# Patient Record
Sex: MALE | Race: WHITE | NOT HISPANIC OR LATINO | Employment: OTHER | URBAN - NONMETROPOLITAN AREA
[De-identification: names, ages, dates, MRNs, and addresses within clinical notes are randomized per-mention and may not be internally consistent; named-entity substitution may affect disease eponyms.]

---

## 2023-04-11 ENCOUNTER — OFFICE VISIT (OUTPATIENT)
Dept: FAMILY MEDICINE CLINIC | Facility: CLINIC | Age: 73
End: 2023-04-11
Payer: MEDICARE

## 2023-04-11 VITALS
HEIGHT: 66 IN | SYSTOLIC BLOOD PRESSURE: 132 MMHG | DIASTOLIC BLOOD PRESSURE: 86 MMHG | HEART RATE: 76 BPM | RESPIRATION RATE: 15 BRPM | WEIGHT: 180 LBS | TEMPERATURE: 97.1 F | BODY MASS INDEX: 28.93 KG/M2 | OXYGEN SATURATION: 96 %

## 2023-04-11 DIAGNOSIS — J30.9 ALLERGIC RHINITIS, UNSPECIFIED SEASONALITY, UNSPECIFIED TRIGGER: ICD-10-CM

## 2023-04-11 DIAGNOSIS — F51.01 PRIMARY INSOMNIA: ICD-10-CM

## 2023-04-11 DIAGNOSIS — K44.9 HIATAL HERNIA: ICD-10-CM

## 2023-04-11 DIAGNOSIS — I10 PRIMARY HYPERTENSION: ICD-10-CM

## 2023-04-11 DIAGNOSIS — E11.9 TYPE 2 DIABETES MELLITUS WITHOUT COMPLICATION, WITHOUT LONG-TERM CURRENT USE OF INSULIN: Primary | ICD-10-CM

## 2023-04-11 PROBLEM — H25.13 AGE-RELATED NUCLEAR CATARACT OF BOTH EYES: Status: ACTIVE | Noted: 2019-09-24

## 2023-04-11 LAB
EXPIRATION DATE: NORMAL
GLUCOSE BLDC GLUCOMTR-MCNC: 192 MG/DL (ref 70–130)
HBA1C MFR BLD: 7.5 %
Lab: NORMAL
POC MICROALBUMIN URINE: NORMAL

## 2023-04-11 RX ORDER — ASPIRIN 81 MG/1
81 TABLET ORAL DAILY
COMMUNITY

## 2023-04-11 RX ORDER — METFORMIN HYDROCHLORIDE 500 MG/1
500 TABLET, EXTENDED RELEASE ORAL 2 TIMES DAILY
Qty: 180 TABLET | Refills: 0 | Status: SHIPPED | OUTPATIENT
Start: 2023-04-11

## 2023-04-11 RX ORDER — METOPROLOL SUCCINATE 50 MG/1
25 TABLET, EXTENDED RELEASE ORAL DAILY
Qty: 90 TABLET | Refills: 1 | Status: SHIPPED | OUTPATIENT
Start: 2023-04-11

## 2023-04-11 RX ORDER — TRAZODONE HYDROCHLORIDE 50 MG/1
25 TABLET ORAL NIGHTLY
COMMUNITY

## 2023-04-11 RX ORDER — METFORMIN HYDROCHLORIDE 500 MG/1
500 TABLET, EXTENDED RELEASE ORAL
COMMUNITY
End: 2023-04-11 | Stop reason: SDUPTHER

## 2023-04-11 RX ORDER — MULTIPLE VITAMINS W/ MINERALS TAB 9MG-400MCG
1 TAB ORAL DAILY
COMMUNITY

## 2023-04-11 RX ORDER — FINASTERIDE 5 MG/1
5 TABLET, FILM COATED ORAL DAILY
COMMUNITY

## 2023-04-11 RX ORDER — TAMSULOSIN HYDROCHLORIDE 0.4 MG/1
1 CAPSULE ORAL DAILY
COMMUNITY

## 2023-04-11 RX ORDER — METOPROLOL SUCCINATE 50 MG/1
25 TABLET, EXTENDED RELEASE ORAL DAILY
COMMUNITY
End: 2023-04-11 | Stop reason: SDUPTHER

## 2023-04-11 RX ORDER — CHLORAL HYDRATE 500 MG
CAPSULE ORAL
COMMUNITY

## 2023-04-11 RX ORDER — CETIRIZINE HYDROCHLORIDE 10 MG/1
10 TABLET ORAL NIGHTLY PRN
Qty: 90 TABLET | Refills: 1 | Status: SHIPPED | OUTPATIENT
Start: 2023-04-11

## 2023-04-11 RX ORDER — SODIUM PHOSPHATE,MONO-DIBASIC 19G-7G/118
ENEMA (ML) RECTAL
COMMUNITY

## 2023-04-11 RX ORDER — ALPRAZOLAM 0.5 MG/1
0.5 TABLET ORAL 2 TIMES DAILY PRN
COMMUNITY
End: 2023-04-11

## 2023-04-11 RX ORDER — LORATADINE 10 MG/1
10 TABLET ORAL DAILY
COMMUNITY
End: 2023-04-11

## 2023-04-11 NOTE — PROGRESS NOTES
Follow Up Office Visit      Patient Name: Greyson Eagle  : 1950   MRN: [id number]     Chief Complaint:    Chief Complaint   Patient presents with   • Establish Care       History of Present Illness: Greyson Eagle is a 72 y.o. male who is here today to   establish care as he has been seen by another practitioner for the past couple years.  Formally was seen at the [place] office and now transferring care here.  He relates that he has diabetes needs blood work and says his sugars have not been as well controlled lately.  We would like refills on his Toprol as well as his metformin.  He also says he been struggling with insomnia and his former providers tried a number of medicines most recently trazodone but does not seem to help he says when he used to take Xanax at night this really helped him sleep well.  He also would like something for his allergies been on Claritin and a lot of sneezing congestion drainage.    His wife seems to think that because he has to do much so much caring for that he has a lot of anxiety and that is why he cannot sleep at night.    Review of Systems   Constitutional: Negative for fatigue and fever.   Respiratory: Negative for cough and shortness of breath.    Cardiovascular: Negative for chest pain and palpitations.   Skin: Negative for rash or itching      Subjective      Review of Systems:   Review of Systems    Past Medical History:   Past Medical History:   Diagnosis Date   • Allergic rhinitis    • Anxiety    • Benign essential hypertension    • Carotid artery disease     Modwerate carotid disease noted at vascular screening   • Diabetes    • Mixed hyperlipidemia        Past Surgical History:   Past Surgical History:   Procedure Laterality Date   • COLON RESECTION      Colonic resection due to large polyp   • CYSTECTOMY      Resection of cyst at previous site of colonic surg   • HEMORRHOIDECTOMY         Family History: History reviewed. No pertinent family  history.    Social History:   Social History     Socioeconomic History   • Marital status:    Tobacco Use   • Smoking status: Never   • Smokeless tobacco: Never   Vaping Use   • Vaping Use: Never used   Substance and Sexual Activity   • Alcohol use: Not Currently   • Drug use: Never   • Sexual activity: Defer       Medications:     Current Outpatient Medications:   •  aspirin 81 MG EC tablet, Take 1 tablet by mouth Daily., Disp: , Rfl:   •  finasteride (PROSCAR) 5 MG tablet, Take 1 tablet by mouth Daily., Disp: , Rfl:   •  glucosamine-chondroitin 500-400 MG capsule capsule, Take  by mouth 3 (Three) Times a Day With Meals., Disp: , Rfl:   •  metFORMIN ER (GLUCOPHAGE-XR) 500 MG 24 hr tablet, Take 1 tablet by mouth 2 (Two) Times a Day., Disp: 180 tablet, Rfl: 0  •  metoprolol succinate XL (TOPROL-XL) 50 MG 24 hr tablet, Take 0.5 tablets by mouth Daily., Disp: 90 tablet, Rfl: 1  •  multivitamin with minerals (ONE-A-DAY 50 PLUS PO), Take 1 tablet by mouth Daily., Disp: , Rfl:   •  Omega-3 Fatty Acids (fish oil) 1000 MG capsule capsule, Take  by mouth Daily With Breakfast., Disp: , Rfl:   •  tamsulosin (FLOMAX) 0.4 MG capsule 24 hr capsule, Take 1 capsule by mouth Daily., Disp: , Rfl:   •  traZODone (DESYREL) 50 MG tablet, Take 25 mg by mouth Every Night., Disp: , Rfl:   •  cetirizine (zyrTEC) 10 MG tablet, Take 1 tablet by mouth At Night As Needed for Allergies., Disp: 90 tablet, Rfl: 1  •  glucose blood test strip, Use as instructed, Disp: 50 each, Rfl: 11    Allergies:   Allergies   Allergen Reactions   • Azithromycin Unknown - High Severity   • Crestor [Rosuvastatin] Unknown - High Severity   • Methylprednisolone Unknown - High Severity   • Percocet [Oxycodone-Acetaminophen] Unknown - High Severity   • Protonix [Pantoprazole] Unknown - High Severity   • Tricor [Fenofibrate] Unknown - High Severity       Objective     Physical Exam:  Vital Signs:   Vitals:    04/11/23 1056   BP: 132/86   BP Location: Left arm  "  Patient Position: Sitting   Cuff Size: Adult   Pulse: 76   Resp: 15   Temp: 97.1 °F (36.2 °C)   TempSrc: Infrared   SpO2: 96%   Weight: 81.6 kg (180 lb)   Height: 167.6 cm (66\")   PainSc: 0-No pain     Body mass index is 29.05 kg/m².     Physical Exam  Vitals and nursing note reviewed.   Constitutional:       Appearance: Normal appearance.   HENT:      Head: Normocephalic and atraumatic.      Right Ear: Tympanic membrane and ear canal normal.      Left Ear: Tympanic membrane and ear canal normal.      Nose: Nose normal.      Mouth/Throat:      Mouth: Mucous membranes are moist.      Pharynx: Oropharynx is clear. No posterior oropharyngeal erythema.   Cardiovascular:      Rate and Rhythm: Normal rate and regular rhythm.   Pulmonary:      Effort: Pulmonary effort is normal.      Breath sounds: Normal breath sounds.   Musculoskeletal:         General: Normal range of motion.      Cervical back: Normal range of motion and neck supple. No rigidity or tenderness.      Right lower leg: No edema.      Left lower leg: No edema.   Lymphadenopathy:      Cervical: No cervical adenopathy.   Skin:     General: Skin is warm and dry.   Neurological:      General: No focal deficit present.      Mental Status: He is alert.   Psychiatric:         Mood and Affect: Mood normal.         Behavior: Behavior normal.         Procedures    PHQ-9 Total Score:       Assessment / Plan      Assessment/Plan:   Diagnoses and all orders for this visit:    1. Type 2 diabetes mellitus without complication, without long-term current use of insulin (Primary)  -     CBC Auto Differential; Future  -     Comprehensive Metabolic Panel; Future  -     POC Glycosylated Hemoglobin (Hb A1C)  -     POC Glucose  -     metFORMIN ER (GLUCOPHAGE-XR) 500 MG 24 hr tablet; Take 1 tablet by mouth 2 (Two) Times a Day.  Dispense: 180 tablet; Refill: 0  -     POC Microalbumin  -     glucose blood test strip; Use as instructed  Dispense: 50 each; Refill: 11  -     CBC Auto " Differential  -     Comprehensive Metabolic Panel    2. Primary hypertension  -     metoprolol succinate XL (TOPROL-XL) 50 MG 24 hr tablet; Take 0.5 tablets by mouth Daily.  Dispense: 90 tablet; Refill: 1    3. Allergic rhinitis, unspecified seasonality, unspecified trigger  -     cetirizine (zyrTEC) 10 MG tablet; Take 1 tablet by mouth At Night As Needed for Allergies.  Dispense: 90 tablet; Refill: 1    4. Primary insomnia    5. Hiatal hernia         His A1c today is 7.5 glucose 192.  We will have him continue his current metformin dose until we get his blood work back to make sure his kidneys functions good.  Follow-up in 6 weeks he knows to work on his diet cut back on carbs and walk 30 minutes 5 days a week    Refilled his metoprolol    For allergies we will use Zyrtec at bedtime hopefully this will help with his insomnia as well as allergies.    Follow-up with urology for his prostate medicines and checkups as directed    We will likely need to do annual wellness later as well as go over blood work.    Microalbumin today as well  BMI is >= 25 and <30. (Overweight) The following options were offered after discussion;: exercise counseling/recommendations and nutrition counseling/recommendations      Follow Up:   Return in about 6 weeks (around 5/23/2023) for Recheck.        Marco Ontiveros MD  [facility]   Portions of note created with Dragon voice recognition technology

## 2023-04-12 ENCOUNTER — TELEPHONE (OUTPATIENT)
Dept: FAMILY MEDICINE CLINIC | Facility: CLINIC | Age: 73
End: 2023-04-12

## 2023-04-12 ENCOUNTER — TELEPHONE (OUTPATIENT)
Dept: FAMILY MEDICINE CLINIC | Facility: CLINIC | Age: 73
End: 2023-04-12
Payer: MEDICARE

## 2023-04-12 LAB
ALBUMIN SERPL-MCNC: 4.3 G/DL (ref 3.7–4.7)
ALBUMIN/GLOB SERPL: 2 {RATIO} (ref 1.2–2.2)
ALP SERPL-CCNC: 62 IU/L (ref 44–121)
ALT SERPL-CCNC: 14 IU/L (ref 0–44)
AST SERPL-CCNC: 14 IU/L (ref 0–40)
BASOPHILS # BLD AUTO: 0.1 X10E3/UL (ref 0–0.2)
BASOPHILS NFR BLD AUTO: 1 %
BILIRUB SERPL-MCNC: 0.3 MG/DL (ref 0–1.2)
BUN SERPL-MCNC: 21 MG/DL (ref 8–27)
BUN/CREAT SERPL: 15 (ref 10–24)
CALCIUM SERPL-MCNC: 9.5 MG/DL (ref 8.6–10.2)
CHLORIDE SERPL-SCNC: 104 MMOL/L (ref 96–106)
CO2 SERPL-SCNC: 21 MMOL/L (ref 20–29)
CREAT SERPL-MCNC: 1.37 MG/DL (ref 0.76–1.27)
EGFRCR SERPLBLD CKD-EPI 2021: 54 ML/MIN/1.73
EOSINOPHIL # BLD AUTO: 0.3 X10E3/UL (ref 0–0.4)
EOSINOPHIL NFR BLD AUTO: 4 %
ERYTHROCYTE [DISTWIDTH] IN BLOOD BY AUTOMATED COUNT: 13.1 % (ref 11.6–15.4)
GLOBULIN SER CALC-MCNC: 2.1 G/DL (ref 1.5–4.5)
GLUCOSE SERPL-MCNC: 180 MG/DL (ref 70–99)
HCT VFR BLD AUTO: 40.1 % (ref 37.5–51)
HGB BLD-MCNC: 13.5 G/DL (ref 13–17.7)
IMM GRANULOCYTES # BLD AUTO: 0 X10E3/UL (ref 0–0.1)
IMM GRANULOCYTES NFR BLD AUTO: 0 %
LYMPHOCYTES # BLD AUTO: 1.9 X10E3/UL (ref 0.7–3.1)
LYMPHOCYTES NFR BLD AUTO: 28 %
MCH RBC QN AUTO: 29.5 PG (ref 26.6–33)
MCHC RBC AUTO-ENTMCNC: 33.7 G/DL (ref 31.5–35.7)
MCV RBC AUTO: 88 FL (ref 79–97)
MONOCYTES # BLD AUTO: 0.5 X10E3/UL (ref 0.1–0.9)
MONOCYTES NFR BLD AUTO: 7 %
NEUTROPHILS # BLD AUTO: 3.9 X10E3/UL (ref 1.4–7)
NEUTROPHILS NFR BLD AUTO: 60 %
PLATELET # BLD AUTO: 218 X10E3/UL (ref 150–450)
POTASSIUM SERPL-SCNC: 4.5 MMOL/L (ref 3.5–5.2)
PROT SERPL-MCNC: 6.4 G/DL (ref 6–8.5)
RBC # BLD AUTO: 4.58 X10E6/UL (ref 4.14–5.8)
SODIUM SERPL-SCNC: 139 MMOL/L (ref 134–144)
WBC # BLD AUTO: 6.6 X10E3/UL (ref 3.4–10.8)

## 2023-04-12 NOTE — TELEPHONE ENCOUNTER
Caller: ELIER MOTA     Relationship:     Best call back number: [phone]    What test was performed: LABS    When was the test performed:  4/12/23      Additional notes:     PATIENT DOES NOT USE COMPUTER AND WANTS US TO MAIL HIS TEST RESULTS

## 2023-04-12 NOTE — TELEPHONE ENCOUNTER
Caller: CHANDRIKA MOTA    Relationship: Emergency Contact    Best call back number:     What medication are you requesting: XANAX LOW DOSE    What are your current symptoms:     UNABLE TO SLEEP     If a prescription is needed, what is your preferred pharmacy and phone number:      [facility] - [place] [address] [phone] [facility] [phone] FX     WANTED TO LET EVERYONE KNOW THAT HER  AND HERSELF LOVE THE OFFICE.  EVERYONE WAS SO NICE

## 2023-05-17 ENCOUNTER — OFFICE VISIT (OUTPATIENT)
Dept: FAMILY MEDICINE CLINIC | Facility: CLINIC | Age: 73
End: 2023-05-17
Payer: MEDICARE

## 2023-05-17 VITALS
RESPIRATION RATE: 15 BRPM | WEIGHT: 180 LBS | BODY MASS INDEX: 28.93 KG/M2 | DIASTOLIC BLOOD PRESSURE: 80 MMHG | HEIGHT: 66 IN | TEMPERATURE: 97.1 F | SYSTOLIC BLOOD PRESSURE: 132 MMHG | OXYGEN SATURATION: 97 % | HEART RATE: 62 BPM

## 2023-05-17 DIAGNOSIS — E11.65 TYPE 2 DIABETES MELLITUS WITH HYPERGLYCEMIA, WITHOUT LONG-TERM CURRENT USE OF INSULIN: Primary | ICD-10-CM

## 2023-05-17 DIAGNOSIS — F51.01 PRIMARY INSOMNIA: ICD-10-CM

## 2023-05-17 DIAGNOSIS — R79.89 ELEVATED SERUM CREATININE: ICD-10-CM

## 2023-05-17 PROCEDURE — 1159F MED LIST DOCD IN RCRD: CPT | Performed by: FAMILY MEDICINE

## 2023-05-17 PROCEDURE — 1160F RVW MEDS BY RX/DR IN RCRD: CPT | Performed by: FAMILY MEDICINE

## 2023-05-17 PROCEDURE — 99214 OFFICE O/P EST MOD 30 MIN: CPT | Performed by: FAMILY MEDICINE

## 2023-05-17 RX ORDER — MIRTAZAPINE 30 MG/1
30 TABLET, FILM COATED ORAL NIGHTLY
Qty: 30 TABLET | Refills: 5 | Status: SHIPPED | OUTPATIENT
Start: 2023-05-17

## 2023-05-17 RX ORDER — METFORMIN HYDROCHLORIDE 750 MG/1
750 TABLET, EXTENDED RELEASE ORAL
Qty: 180 TABLET | Refills: 0 | Status: SHIPPED | OUTPATIENT
Start: 2023-05-17 | End: 2023-05-18 | Stop reason: SDUPTHER

## 2023-05-17 NOTE — PROGRESS NOTES
Follow Up Office Visit      Patient Name: Greyson Eagle  : 1950   MRN: [id number]     Chief Complaint:    Chief Complaint   Patient presents with   • Follow-up       History of Present Illness: Greyson Eagle is a 72 y.o. male who is here today to   follow-up on blood work and his A1c was always elevated 7.5.  He relates trazodone not helping with his sleep he really wants Xanax.  His former provider tried some other medicines that did not help.  He does have a little distressed caring for his wife.  She was just recently in the hospital for 3 days.  He says he is active and mows yards and get some exercise that way.  He has been on metformin 500 twice daily without difficulty.      Review of Systems   Constitutional: Negative for fatigue and fever.   Respiratory: Negative for cough and shortness of breath.    Cardiovascular: Negative for chest pain and palpitations.   Skin: Negative for rash or itching      Subjective      Review of Systems:   Review of Systems    Past Medical History:   Past Medical History:   Diagnosis Date   • Allergic rhinitis    • Anxiety    • Benign essential hypertension    • Carotid artery disease     Modwerate carotid disease noted at vascular screening   • Diabetes    • Mixed hyperlipidemia        Past Surgical History:   Past Surgical History:   Procedure Laterality Date   • COLON RESECTION      Colonic resection due to large polyp   • CYSTECTOMY      Resection of cyst at previous site of colonic surg   • HEMORRHOIDECTOMY         Family History: History reviewed. No pertinent family history.    Social History:   Social History     Socioeconomic History   • Marital status:    Tobacco Use   • Smoking status: Never   • Smokeless tobacco: Never   Vaping Use   • Vaping Use: Never used   Substance and Sexual Activity   • Alcohol use: Not Currently   • Drug use: Never   • Sexual activity: Defer       Medications:     Current Outpatient Medications:   •  aspirin 81 MG EC  "tablet, Take 1 tablet by mouth Daily., Disp: , Rfl:   •  cetirizine (zyrTEC) 10 MG tablet, Take 1 tablet by mouth At Night As Needed for Allergies., Disp: 90 tablet, Rfl: 1  •  finasteride (PROSCAR) 5 MG tablet, Take 1 tablet by mouth Daily., Disp: , Rfl:   •  glucosamine-chondroitin 500-400 MG capsule capsule, Take  by mouth 3 (Three) Times a Day With Meals., Disp: , Rfl:   •  glucose blood test strip, Use as instructed, Disp: 50 each, Rfl: 11  •  metFORMIN ER (GLUCOPHAGE-XR) 750 MG 24 hr tablet, Take 1 tablet by mouth Daily With Breakfast., Disp: 180 tablet, Rfl: 0  •  metoprolol succinate XL (TOPROL-XL) 50 MG 24 hr tablet, Take 0.5 tablets by mouth Daily., Disp: 90 tablet, Rfl: 1  •  mirtazapine (Remeron) 30 MG tablet, Take 1 tablet by mouth Every Night., Disp: 30 tablet, Rfl: 5  •  multivitamin with minerals (ONE-A-DAY 50 PLUS PO), Take 1 tablet by mouth Daily., Disp: , Rfl:   •  Omega-3 Fatty Acids (fish oil) 1000 MG capsule capsule, Take  by mouth Daily With Breakfast., Disp: , Rfl:   •  tamsulosin (FLOMAX) 0.4 MG capsule 24 hr capsule, Take 1 capsule by mouth Daily., Disp: , Rfl:     Allergies:   Allergies   Allergen Reactions   • Azithromycin Unknown - High Severity   • Crestor [Rosuvastatin] Unknown - High Severity   • Methylprednisolone Unknown - High Severity   • Percocet [Oxycodone-Acetaminophen] Unknown - High Severity   • Protonix [Pantoprazole] Unknown - High Severity   • Tricor [Fenofibrate] Unknown - High Severity       Objective     Physical Exam:  Vital Signs:   Vitals:    05/17/23 1254   BP: 132/80   BP Location: Left arm   Patient Position: Sitting   Cuff Size: Adult   Pulse: 62   Resp: 15   Temp: 97.1 °F (36.2 °C)   TempSrc: Infrared   SpO2: 97%   Weight: 81.6 kg (180 lb)   Height: 167.6 cm (66\")   PainSc: 0-No pain     Body mass index is 29.05 kg/m².     Physical Exam  Constitutional:       Appearance: Normal appearance.   Pulmonary:      Effort: Pulmonary effort is normal.   Neurological:     "  Mental Status: He is alert.   Psychiatric:         Mood and Affect: Mood normal.         Behavior: Behavior normal.         Procedures    PHQ-9 Total Score:       Assessment / Plan      Assessment/Plan:   Diagnoses and all orders for this visit:    1. Type 2 diabetes mellitus with hyperglycemia, without long-term current use of insulin (Primary)  -     metFORMIN ER (GLUCOPHAGE-XR) 750 MG 24 hr tablet; Take 1 tablet by mouth Daily With Breakfast.  Dispense: 180 tablet; Refill: 0  -     Comprehensive Metabolic Panel; Future  -     Hemoglobin A1c; Future    2. Primary insomnia  -     mirtazapine (Remeron) 30 MG tablet; Take 1 tablet by mouth Every Night.  Dispense: 30 tablet; Refill: 5    3. Elevated serum creatinine    Labs reviewed with him    For his diabetes which is not quite to goal will work on cutting back on carbs and sweets increase metformin to 750 twice daily in the short-term he can take 500 in the morning and 2 500s in the evening until the those run out.    Avoid NSAIDs follow-up with blood work in 2 to 3 months as directed    For insomnia we will try to avoid the benzos specially Xanax and will start Remeron 30 to 60 minutes prior to bedtime as directed follow-up in 2 to 3 months as directed.  Stop trazodone      BMI is >= 25 and <30. (Overweight) The following options were offered after discussion;: exercise counseling/recommendations and nutrition counseling/recommendations      Follow Up:   Return in about 11 weeks (around 8/2/2023) for Labs prior next visit, Recheck, Schedule AWV w/next office visit.        Marco Ontiveros MD  [facility]   Portions of note created with Dragon voice recognition technology

## 2023-05-18 ENCOUNTER — TELEPHONE (OUTPATIENT)
Dept: FAMILY MEDICINE CLINIC | Facility: CLINIC | Age: 73
End: 2023-05-18
Payer: MEDICARE

## 2023-05-18 DIAGNOSIS — E11.65 TYPE 2 DIABETES MELLITUS WITH HYPERGLYCEMIA, WITHOUT LONG-TERM CURRENT USE OF INSULIN: ICD-10-CM

## 2023-05-18 RX ORDER — METFORMIN HYDROCHLORIDE 750 MG/1
750 TABLET, EXTENDED RELEASE ORAL 2 TIMES DAILY WITH MEALS
Qty: 180 TABLET | Refills: 0 | Status: SHIPPED | OUTPATIENT
Start: 2023-05-18

## 2023-05-18 NOTE — TELEPHONE ENCOUNTER
Pharmacy wanted clarification on pt's metformin,k whether it's take twice a day or once a day?    [place]: [phone]

## 2023-08-16 ENCOUNTER — LAB (OUTPATIENT)
Dept: FAMILY MEDICINE CLINIC | Facility: CLINIC | Age: 73
End: 2023-08-16
Payer: MEDICARE

## 2023-08-16 DIAGNOSIS — E11.65 TYPE 2 DIABETES MELLITUS WITH HYPERGLYCEMIA, WITHOUT LONG-TERM CURRENT USE OF INSULIN: ICD-10-CM

## 2023-08-16 PROCEDURE — 36415 COLL VENOUS BLD VENIPUNCTURE: CPT | Performed by: FAMILY MEDICINE

## 2023-08-17 LAB
ALBUMIN SERPL-MCNC: 4 G/DL (ref 3.8–4.8)
ALBUMIN/GLOB SERPL: 1.8 {RATIO} (ref 1.2–2.2)
ALP SERPL-CCNC: 59 IU/L (ref 44–121)
ALT SERPL-CCNC: 14 IU/L (ref 0–44)
AST SERPL-CCNC: 18 IU/L (ref 0–40)
BILIRUB SERPL-MCNC: 0.4 MG/DL (ref 0–1.2)
BUN SERPL-MCNC: 22 MG/DL (ref 8–27)
BUN/CREAT SERPL: 16 (ref 10–24)
CALCIUM SERPL-MCNC: 9 MG/DL (ref 8.6–10.2)
CHLORIDE SERPL-SCNC: 106 MMOL/L (ref 96–106)
CO2 SERPL-SCNC: 22 MMOL/L (ref 20–29)
CREAT SERPL-MCNC: 1.37 MG/DL (ref 0.76–1.27)
EGFRCR SERPLBLD CKD-EPI 2021: 54 ML/MIN/1.73
GLOBULIN SER CALC-MCNC: 2.2 G/DL (ref 1.5–4.5)
GLUCOSE SERPL-MCNC: 136 MG/DL (ref 70–99)
HBA1C MFR BLD: 7.7 % (ref 4.8–5.6)
POTASSIUM SERPL-SCNC: 4.7 MMOL/L (ref 3.5–5.2)
PROT SERPL-MCNC: 6.2 G/DL (ref 6–8.5)
SODIUM SERPL-SCNC: 140 MMOL/L (ref 134–144)

## 2023-09-14 ENCOUNTER — TELEPHONE (OUTPATIENT)
Dept: FAMILY MEDICINE CLINIC | Facility: CLINIC | Age: 73
End: 2023-09-14

## 2023-09-14 DIAGNOSIS — M25.569 ACUTE KNEE PAIN, UNSPECIFIED LATERALITY: Primary | ICD-10-CM

## 2023-09-14 NOTE — TELEPHONE ENCOUNTER
PATIENTS WIFE CALLED REQUESTING A REFERRAL TO ORTHOPEDICS FOR PATIENT, PREFERABLY ONE IN [place].      PATIENT FELL ON 23 WHILE PUTTING A  CALF IN THE BARN, GROUND WAS UNEVEN AND ROUGH CAUSING PATIENT TO FALL, INJURING HIS KNEE.   PATIENT WENT TO THE EMERGENCY ROOM AT [facility]. ER PHYSICIAN ADVISED PATIENT TO FOLLOW UP WITH PCP AND GET A REFERRAL TO ORTHOPEDICS. ER DID STATE THAT NOTHING WAS BROKEN.     PATIENT HAS AN APPT WITH DR VEGA ON , PATIENT WILL BRING ER SUMMARY TO APPT.

## 2023-09-14 NOTE — TELEPHONE ENCOUNTER
Caller: CHANDRIKA MOTA    Relationship to patient: Emergency Contact    Best call back number: [phone]     New or established patient?  [] New  [x] Established    Date of discharge: 9/13    Facility discharged from: [facility] [place]     Diagnosis/Symptoms: KNEE PAIN, SWOLLEN KNEE

## 2023-09-14 NOTE — TELEPHONE ENCOUNTER
Caller: CHANDRIKA MOTA    Relationship: Emergency Contact    Best call back number: [phone]     What is the medical concern/diagnosis: SWOLLEN LEFT KNEE AND KNEE PAIN    What specialty or service is being requested: ORTHOPEDICS    What is the provider, practice or medical service name: REFERRAL     Any additional details: PATIENT FELL AND WENT TO THE ER ON 9/13 AND AND HAS BEEN HAVING KNEE PAIN, AND KNEE HAS BEEN SWOLLEN, ER ADVISED PATIENT TO GET WITH PRIMARY CARE PROVIDER TO GET AN ORTHOPEDIC SPECIALIST

## 2023-09-18 ENCOUNTER — OFFICE VISIT (OUTPATIENT)
Dept: FAMILY MEDICINE CLINIC | Facility: CLINIC | Age: 73
End: 2023-09-18
Payer: MEDICARE

## 2023-09-18 VITALS
HEART RATE: 69 BPM | BODY MASS INDEX: 28.45 KG/M2 | DIASTOLIC BLOOD PRESSURE: 82 MMHG | OXYGEN SATURATION: 97 % | WEIGHT: 177 LBS | SYSTOLIC BLOOD PRESSURE: 136 MMHG | HEIGHT: 66 IN

## 2023-09-18 DIAGNOSIS — Z79.899 HIGH RISK MEDICATION USE: ICD-10-CM

## 2023-09-18 DIAGNOSIS — I10 PRIMARY HYPERTENSION: ICD-10-CM

## 2023-09-18 DIAGNOSIS — E11.65 TYPE 2 DIABETES MELLITUS WITH HYPERGLYCEMIA, WITHOUT LONG-TERM CURRENT USE OF INSULIN: ICD-10-CM

## 2023-09-18 DIAGNOSIS — E78.5 HYPERLIPIDEMIA, UNSPECIFIED HYPERLIPIDEMIA TYPE: ICD-10-CM

## 2023-09-18 DIAGNOSIS — F41.9 ANXIETY: ICD-10-CM

## 2023-09-18 DIAGNOSIS — M62.838 MUSCLE SPASM OF LEFT LOWER EXTREMITY: ICD-10-CM

## 2023-09-18 DIAGNOSIS — M25.562 ACUTE PAIN OF LEFT KNEE: ICD-10-CM

## 2023-09-18 DIAGNOSIS — Z00.00 ENCOUNTER FOR SUBSEQUENT ANNUAL WELLNESS VISIT (AWV) IN MEDICARE PATIENT: Primary | ICD-10-CM

## 2023-09-18 LAB
POC AMPHETAMINES: NEGATIVE
POC BARBITURATES: NEGATIVE
POC BENZODIAZEPHINES: NEGATIVE
POC COCAINE: NEGATIVE
POC METHADONE: NEGATIVE
POC METHAMPHETAMINE SCREEN URINE: NEGATIVE
POC OPIATES: NEGATIVE
POC OXYCODONE: NEGATIVE
POC PHENCYCLIDINE: NEGATIVE
POC PROPOXYPHENE: NEGATIVE
POC THC: NEGATIVE
POC TRICYCLIC ANTIDEPRESSANTS: NEGATIVE

## 2023-09-18 PROCEDURE — 99214 OFFICE O/P EST MOD 30 MIN: CPT | Performed by: FAMILY MEDICINE

## 2023-09-18 PROCEDURE — G0439 PPPS, SUBSEQ VISIT: HCPCS | Performed by: FAMILY MEDICINE

## 2023-09-18 PROCEDURE — 80305 DRUG TEST PRSMV DIR OPT OBS: CPT | Performed by: FAMILY MEDICINE

## 2023-09-18 PROCEDURE — 1160F RVW MEDS BY RX/DR IN RCRD: CPT | Performed by: FAMILY MEDICINE

## 2023-09-18 PROCEDURE — 3051F HG A1C>EQUAL 7.0%<8.0%: CPT | Performed by: FAMILY MEDICINE

## 2023-09-18 PROCEDURE — 1159F MED LIST DOCD IN RCRD: CPT | Performed by: FAMILY MEDICINE

## 2023-09-18 PROCEDURE — 1170F FXNL STATUS ASSESSED: CPT | Performed by: FAMILY MEDICINE

## 2023-09-18 RX ORDER — ALPRAZOLAM 0.5 MG/1
0.5 TABLET ORAL 2 TIMES DAILY PRN
Qty: 30 TABLET | Refills: 2 | Status: SHIPPED | OUTPATIENT
Start: 2023-09-18

## 2023-09-18 RX ORDER — METOPROLOL SUCCINATE 50 MG/1
25 TABLET, EXTENDED RELEASE ORAL DAILY
Qty: 90 TABLET | Refills: 1 | Status: SHIPPED | OUTPATIENT
Start: 2023-09-18

## 2023-09-18 RX ORDER — METHOCARBAMOL 500 MG/1
500 TABLET, FILM COATED ORAL 4 TIMES DAILY PRN
Qty: 30 TABLET | Refills: 0 | Status: SHIPPED | OUTPATIENT
Start: 2023-09-18

## 2023-09-18 RX ORDER — METFORMIN HYDROCHLORIDE 750 MG/1
750 TABLET, EXTENDED RELEASE ORAL 2 TIMES DAILY WITH MEALS
Qty: 180 TABLET | Refills: 0 | Status: SHIPPED | OUTPATIENT
Start: 2023-09-18

## 2023-09-18 NOTE — PATIENT INSTRUCTIONS

## 2023-09-18 NOTE — PROGRESS NOTES
The ABCs of the Annual Wellness Visit  Subsequent Medicare Wellness Visit    Subjective    Greyson Eagle is a 73 y.o. male who presents for a Subsequent Medicare Wellness Visit.    The following portions of the patient's history were reviewed and   updated as appropriate: allergies, current medications, past family history, past medical history, past social history, past surgical history, and problem list.    Compared to one year ago, the patient feels his physical   health is the same.    Compared to one year ago, the patient feels his mental   health is the same.    Recent Hospitalizations:  He was not admitted to the hospital during the last year.       Current Medical Providers:  Patient Care Team:  Marco Ontiveros MD as PCP - General (Family Medicine)    Outpatient Medications Prior to Visit   Medication Sig Dispense Refill   • aspirin 81 MG EC tablet Take 1 tablet by mouth Daily.     • cetirizine (zyrTEC) 10 MG tablet Take 1 tablet by mouth At Night As Needed for Allergies. 90 tablet 1   • finasteride (PROSCAR) 5 MG tablet Take 1 tablet by mouth Daily.     • glucosamine-chondroitin 500-400 MG capsule capsule Take  by mouth 3 (Three) Times a Day With Meals.     • glucose blood test strip Use as instructed 50 each 11   • multivitamin with minerals (ONE-A-DAY 50 PLUS PO) Take 1 tablet by mouth Daily.     • Omega-3 Fatty Acids (fish oil) 1000 MG capsule capsule Take  by mouth Daily With Breakfast.     • tamsulosin (FLOMAX) 0.4 MG capsule 24 hr capsule Take 1 capsule by mouth Daily.     • metFORMIN ER (GLUCOPHAGE-XR) 750 MG 24 hr tablet Take 1 tablet by mouth 2 (Two) Times a Day With Meals. 180 tablet 0   • metoprolol succinate XL (TOPROL-XL) 50 MG 24 hr tablet Take 0.5 tablets by mouth Daily. 90 tablet 1   • mirtazapine (Remeron) 30 MG tablet Take 1 tablet by mouth Every Night. 30 tablet 5     No facility-administered medications prior to visit.       No opioid medication identified on active medication list. I have  "reviewed chart for other potential  high risk medication/s and harmful drug interactions in the elderly.        Aspirin is on active medication list. Aspirin use is indicated based on review of current medical condition/s. Pros and cons of this therapy have been discussed today. Benefits of this medication outweigh potential harm.  Patient has been encouraged to continue taking this medication.  .new  data  and us prev task force  recs  d/w  him--he says he may  take  qod as he wants to  continue it.      Patient Active Problem List   Diagnosis   • Age-related nuclear cataract of both eyes   • Diabetes mellitus without complication     Advance Care Planning   Advance Care Planning     Advance Directive is not on file.  ACP discussion was held with the patient during this visit. Patient does not have an advance directive, information provided. Pt wife would be his healthcare surrogate; Haylee Eagle [phone]     Objective    Vitals:    23 1057   BP: 136/82   BP Location: Left arm   Patient Position: Sitting   Cuff Size: Adult   Pulse: 69   SpO2: 97%   Weight: 80.3 kg (177 lb)   Height: 167.6 cm (66\")     Estimated body mass index is 28.57 kg/m² as calculated from the following:    Height as of this encounter: 167.6 cm (66\").    Weight as of this encounter: 80.3 kg (177 lb).           Does the patient have evidence of cognitive impairment? No    Lab Results   Component Value Date    HGBA1C 7.7 (H) 2023        HEALTH RISK ASSESSMENT    Smoking Status:  Social History     Tobacco Use   Smoking Status Never   Smokeless Tobacco Never     Alcohol Consumption:  Social History     Substance and Sexual Activity   Alcohol Use Not Currently     Fall Risk Screen:    STEADI Fall Risk Assessment was completed, and patient is at HIGH risk for falls. Assessment completed on:2023    Depression Screenin/18/2023    10:59 AM   PHQ-2/PHQ-9 Depression Screening   Little Interest or Pleasure in Doing Things " 0-->not at all   Feeling Down, Depressed or Hopeless 0-->not at all   PHQ-9: Brief Depression Severity Measure Score 0       Health Habits and Functional and Cognitive Screenin/18/2023    10:59 AM   Functional & Cognitive Status   Do you have difficulty preparing food and eating? No   Do you have difficulty bathing yourself, getting dressed or grooming yourself? No   Do you have difficulty using the toilet? No   Do you have difficulty moving around from place to place? No   Do you have trouble with steps or getting out of a bed or a chair? No   Current Diet Well Balanced Diet   Dental Exam Not up to date   Eye Exam Not up to date   Exercise (times per week) 0 times per week   Current Exercises Include No Regular Exercise   Do you need help using the phone?  No   Are you deaf or do you have serious difficulty hearing?  No   Do you need help to go to places out of walking distance? No   Do you need help shopping? No   Do you need help preparing meals?  No   Do you need help with housework?  No   Do you need help with laundry? No   Do you need help taking your medications? No   Do you need help managing money? No   Do you ever drive or ride in a car without wearing a seat belt? No   Have you felt unusual stress, anger or loneliness in the last month? No   Who do you live with? Spouse   If you need help, do you have trouble finding someone available to you? No   Do you have difficulty concentrating, remembering or making decisions? No       Age-appropriate Screening Schedule:  Refer to the list below for future screening recommendations based on patient's age, sex and/or medical conditions. Orders for these recommended tests are listed in the plan section. The patient has been provided with a written plan.    Health Maintenance   Topic Date Due   • COLORECTAL CANCER SCREENING  Never done   • COVID-19 Vaccine (1) Never done   • Pneumococcal Vaccine 65+ (1 - PCV) Never done   • ZOSTER VACCINE (1 of 2) Never done    • TDAP/TD VACCINES (2 - Tdap) 02/23/2017   • HEPATITIS C SCREENING  Never done   • ANNUAL WELLNESS VISIT  Never done   • DIABETIC FOOT EXAM  Never done   • DIABETIC EYE EXAM  04/06/2023   • LIPID PANEL  09/23/2023   • INFLUENZA VACCINE  10/01/2023   • HEMOGLOBIN A1C  02/16/2024   • URINE MICROALBUMIN  04/11/2024   • BMI FOLLOWUP  05/17/2024                  CMS Preventative Services Quick Reference  Risk Factors Identified During Encounter  None Identified  The above risks/problems have been discussed with the patient.  Pertinent information has been shared with the patient in the After Visit Summary.  An After Visit Summary and PPPS were made available to the patient.    Follow Up:   Next Medicare Wellness visit to be scheduled in 1 year.       Additional E&M Note during same encounter follows:  Patient has multiple medical problems which are significant and separately identifiable that require additional work above and beyond the Medicare Wellness Visit.      Chief Complaint  Medicare Wellness-subsequent    Subjective        HPI  Greyson Eagle is also being seen today for his also here to follow-up on his chronic medical problems and to go over blood work.  He says he is been to the ER as he was getting a cath in the barn and stepped in a hole and fell down right on his left knee it swelled up really big and he went to the ER they did x-rays which were negative and very little to no effusion he was recommended to go see orthopedics and they gave him a brace they also gave him some Norco he took the pain pills but then broke out in a rash but it was just underneath the brace area where he broke out.  So he stopped those.  He said he has been taking a little bit ibuprofen occasionally    He said he did not like the mirtazapine.  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  Difficult he may take a half a tablet at bedtime rarely  "during the day    Says he really does not tolerate statins said he ran out of the 750s we increased him to twice daily and so has been taking left over 500s.  And needs a referral to the orthopedist         Objective   Vital Signs:  /82 (BP Location: Left arm, Patient Position: Sitting, Cuff Size: Adult)   Pulse 69   Ht 167.6 cm (66\")   Wt 80.3 kg (177 lb)   SpO2 97%   BMI 28.57 kg/m²     Physical Exam  Vitals and nursing note reviewed.   Constitutional:       Appearance: Normal appearance.   HENT:      Head: Normocephalic and atraumatic.      Nose: Nose normal.   Cardiovascular:      Rate and Rhythm: Normal rate and regular rhythm.   Pulmonary:      Effort: Pulmonary effort is normal.      Breath sounds: Normal breath sounds.   Musculoskeletal:         General: Normal range of motion.      Cervical back: Normal range of motion and neck supple.      Right lower leg: No edema.      Left lower leg: No edema.   Skin:     General: Skin is warm and dry.      Comments: He has a little bit of ecchymoses overlying the left knee Is mildly swollen also.  He has the brace on but now he is got longjohns between his skin and the brace so there is no rash   Neurological:      General: No focal deficit present.      Mental Status: He is alert.   Psychiatric:         Mood and Affect: Mood normal.         Behavior: Behavior normal.        Labs reviewed with him               Assessment and Plan   Diagnoses and all orders for this visit:    1. Encounter for subsequent annual wellness visit (AWV) in Medicare patient (Primary)    2. Type 2 diabetes mellitus with hyperglycemia, without long-term current use of insulin  -     metFORMIN ER (GLUCOPHAGE-XR) 750 MG 24 hr tablet; Take 1 tablet by mouth 2 (Two) Times a Day With Meals.  Dispense: 180 tablet; Refill: 0  -     CK; Future  -     Comprehensive Metabolic Panel; Future  -     Hemoglobin A1c; Future    3. Primary hypertension  -     metoprolol succinate XL (TOPROL-XL) 50 MG " 24 hr tablet; Take 0.5 tablets by mouth Daily.  Dispense: 90 tablet; Refill: 1    4. Acute pain of left knee    5. Muscle spasm of left lower extremity  -     methocarbamol (ROBAXIN) 500 MG tablet; Take 1 tablet by mouth 4 (Four) Times a Day As Needed for Muscle Spasms.  Dispense: 30 tablet; Refill: 0    6. Anxiety  -     ALPRAZolam (Xanax) 0.5 MG tablet; Take 1 tablet by mouth 2 (Two) Times a Day As Needed for Anxiety.  Dispense: 30 tablet; Refill: 2    7. High risk medication use  -     POC Urine Drug Screen, Triage  -     CK; Future  -     Comprehensive Metabolic Panel; Future  -     CBC Auto Differential; Future    8. Hyperlipidemia, unspecified hyperlipidemia type  -     Lipid Panel; Future    Annual wellness completed    ER notes reviewed    Labs reviewed    For his knee he requested a muscle relaxer gave him some methocarbamol to use as he does get spasms if he flexes the knee past 90 degrees and continue with his Tylenol plain.  He is no longer taking Norco he says he does not like that it made him have a rash.  Although I told him I suspect it is mostly from the brace as his rash was only in the area where it came in contact with the skin    Referral to Dr. Choudhary as requested    For his anxiety and insomnia did agree to give him some Xanax to use on a as needed basis he did sign the consent form UDS was negative Jose Luis is appropriate he knows not to take this with Norco    A1c was elevated need to get him back on the 750 metformin twice a day and follow-up in 3 months for repeat blood work and will need repeat consent Jose Luis check etc.    Medications risk benefits side effects discussed with him and agrees to proceed he wanted medicines printed as he may shop around for better prices.    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  Patient was given instructions and counseling regarding his condition or for health maintenance advice. Please see specific information pulled into the AVS if appropriate.

## 2023-12-13 ENCOUNTER — LAB (OUTPATIENT)
Dept: FAMILY MEDICINE CLINIC | Facility: CLINIC | Age: 73
End: 2023-12-13
Payer: MEDICARE

## 2023-12-13 DIAGNOSIS — Z79.899 HIGH RISK MEDICATION USE: ICD-10-CM

## 2023-12-13 DIAGNOSIS — E11.65 TYPE 2 DIABETES MELLITUS WITH HYPERGLYCEMIA, WITHOUT LONG-TERM CURRENT USE OF INSULIN: ICD-10-CM

## 2023-12-13 DIAGNOSIS — E78.5 HYPERLIPIDEMIA, UNSPECIFIED HYPERLIPIDEMIA TYPE: ICD-10-CM

## 2023-12-13 PROCEDURE — 36415 COLL VENOUS BLD VENIPUNCTURE: CPT | Performed by: FAMILY MEDICINE

## 2023-12-14 LAB
ALBUMIN SERPL-MCNC: 4 G/DL (ref 3.8–4.8)
ALBUMIN/GLOB SERPL: 1.8 {RATIO} (ref 1.2–2.2)
ALP SERPL-CCNC: 56 IU/L (ref 44–121)
ALT SERPL-CCNC: 12 IU/L (ref 0–44)
AST SERPL-CCNC: 15 IU/L (ref 0–40)
BASOPHILS # BLD AUTO: 0 X10E3/UL (ref 0–0.2)
BASOPHILS NFR BLD AUTO: 1 %
BILIRUB SERPL-MCNC: 0.4 MG/DL (ref 0–1.2)
BUN SERPL-MCNC: 24 MG/DL (ref 8–27)
BUN/CREAT SERPL: 16 (ref 10–24)
CALCIUM SERPL-MCNC: 9.2 MG/DL (ref 8.6–10.2)
CHLORIDE SERPL-SCNC: 105 MMOL/L (ref 96–106)
CHOLEST SERPL-MCNC: 184 MG/DL (ref 100–199)
CK SERPL-CCNC: 86 U/L (ref 41–331)
CO2 SERPL-SCNC: 22 MMOL/L (ref 20–29)
CREAT SERPL-MCNC: 1.46 MG/DL (ref 0.76–1.27)
EGFRCR SERPLBLD CKD-EPI 2021: 50 ML/MIN/1.73
EOSINOPHIL # BLD AUTO: 0.2 X10E3/UL (ref 0–0.4)
EOSINOPHIL NFR BLD AUTO: 4 %
ERYTHROCYTE [DISTWIDTH] IN BLOOD BY AUTOMATED COUNT: 12.5 % (ref 11.6–15.4)
GLOBULIN SER CALC-MCNC: 2.2 G/DL (ref 1.5–4.5)
GLUCOSE SERPL-MCNC: 146 MG/DL (ref 70–99)
HBA1C MFR BLD: 7.7 % (ref 4.8–5.6)
HCT VFR BLD AUTO: 38.2 % (ref 37.5–51)
HDLC SERPL-MCNC: 39 MG/DL
HGB BLD-MCNC: 12.7 G/DL (ref 13–17.7)
IMM GRANULOCYTES # BLD AUTO: 0 X10E3/UL (ref 0–0.1)
IMM GRANULOCYTES NFR BLD AUTO: 0 %
LDLC SERPL CALC-MCNC: 128 MG/DL (ref 0–99)
LYMPHOCYTES # BLD AUTO: 1.7 X10E3/UL (ref 0.7–3.1)
LYMPHOCYTES NFR BLD AUTO: 25 %
MCH RBC QN AUTO: 28.9 PG (ref 26.6–33)
MCHC RBC AUTO-ENTMCNC: 33.2 G/DL (ref 31.5–35.7)
MCV RBC AUTO: 87 FL (ref 79–97)
MONOCYTES # BLD AUTO: 0.6 X10E3/UL (ref 0.1–0.9)
MONOCYTES NFR BLD AUTO: 10 %
NEUTROPHILS # BLD AUTO: 4.1 X10E3/UL (ref 1.4–7)
NEUTROPHILS NFR BLD AUTO: 60 %
PLATELET # BLD AUTO: 214 X10E3/UL (ref 150–450)
POTASSIUM SERPL-SCNC: 4.7 MMOL/L (ref 3.5–5.2)
PROT SERPL-MCNC: 6.2 G/DL (ref 6–8.5)
RBC # BLD AUTO: 4.4 X10E6/UL (ref 4.14–5.8)
SODIUM SERPL-SCNC: 141 MMOL/L (ref 134–144)
TRIGL SERPL-MCNC: 90 MG/DL (ref 0–149)
VLDLC SERPL CALC-MCNC: 17 MG/DL (ref 5–40)
WBC # BLD AUTO: 6.7 X10E3/UL (ref 3.4–10.8)

## 2023-12-20 ENCOUNTER — OFFICE VISIT (OUTPATIENT)
Dept: FAMILY MEDICINE CLINIC | Facility: CLINIC | Age: 73
End: 2023-12-20
Payer: MEDICARE

## 2023-12-20 VITALS
WEIGHT: 177 LBS | OXYGEN SATURATION: 97 % | HEIGHT: 66 IN | HEART RATE: 67 BPM | SYSTOLIC BLOOD PRESSURE: 134 MMHG | DIASTOLIC BLOOD PRESSURE: 86 MMHG | BODY MASS INDEX: 28.45 KG/M2

## 2023-12-20 DIAGNOSIS — I10 PRIMARY HYPERTENSION: ICD-10-CM

## 2023-12-20 DIAGNOSIS — F41.9 ANXIETY: ICD-10-CM

## 2023-12-20 DIAGNOSIS — Z11.59 ENCOUNTER FOR HEPATITIS C SCREENING TEST FOR LOW RISK PATIENT: ICD-10-CM

## 2023-12-20 DIAGNOSIS — E11.65 TYPE 2 DIABETES MELLITUS WITH HYPERGLYCEMIA, WITHOUT LONG-TERM CURRENT USE OF INSULIN: Primary | ICD-10-CM

## 2023-12-20 DIAGNOSIS — Z79.899 HIGH RISK MEDICATION USE: ICD-10-CM

## 2023-12-20 PROCEDURE — 3051F HG A1C>EQUAL 7.0%<8.0%: CPT | Performed by: FAMILY MEDICINE

## 2023-12-20 PROCEDURE — 99214 OFFICE O/P EST MOD 30 MIN: CPT | Performed by: FAMILY MEDICINE

## 2023-12-20 PROCEDURE — 1159F MED LIST DOCD IN RCRD: CPT | Performed by: FAMILY MEDICINE

## 2023-12-20 PROCEDURE — 1160F RVW MEDS BY RX/DR IN RCRD: CPT | Performed by: FAMILY MEDICINE

## 2023-12-20 RX ORDER — ALPRAZOLAM 0.5 MG/1
0.5 TABLET ORAL 2 TIMES DAILY PRN
Qty: 30 TABLET | Refills: 2 | Status: SHIPPED | OUTPATIENT
Start: 2023-12-20

## 2023-12-20 RX ORDER — METFORMIN HYDROCHLORIDE 750 MG/1
750 TABLET, EXTENDED RELEASE ORAL 2 TIMES DAILY WITH MEALS
Qty: 180 TABLET | Refills: 0 | Status: SHIPPED | OUTPATIENT
Start: 2023-12-20

## 2023-12-20 RX ORDER — LISINOPRIL 10 MG/1
10 TABLET ORAL DAILY
Qty: 90 TABLET | Refills: 1 | Status: SHIPPED | OUTPATIENT
Start: 2023-12-20

## 2023-12-20 RX ORDER — GLIMEPIRIDE 2 MG/1
2 TABLET ORAL
Qty: 90 TABLET | Refills: 1 | Status: SHIPPED | OUTPATIENT
Start: 2023-12-20

## 2023-12-20 NOTE — PROGRESS NOTES
Follow Up Office Visit      Patient Name: Greyson Eagle  : 1950   MRN: [id number]     Chief Complaint:    Chief Complaint   Patient presents with    Follow-up       History of Present Illness: Greyson Eagle is a 73 y.o. male who is here today to   follow-up on his chronic medical problems and to go over blood work.  He relates he has been doing pretty well he has 1 more prescription on Xanax to fill yet.  He just uses it on a as needed basis.  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  And does have arthritis in his knees but is getting along okay.    Labs reviewed his A1c is about the same on the increased dose metformin 750.  He says sugars are running anywhere from 1 29-1 50 generally at home    He has had no GI blood loss no melena no bleeding issues but his hemoglobin was 12.7 little low (similar to his wife's labs done the same day) but the hematocrit was normal.    Review of Systems   Constitutional: Negative for fatigue and fever.   Respiratory: Negative for cough and shortness of breath.    Cardiovascular: Negative for chest pain and palpitations.   Skin: Negative for rash or itching      Subjective      Review of Systems:   Review of Systems    Past Medical History:   Past Medical History:   Diagnosis Date    Allergic rhinitis     Anxiety     Benign essential hypertension     Carotid artery disease     Modwerate carotid disease noted at vascular screening    Diabetes     Mixed hyperlipidemia        Past Surgical History:   Past Surgical History:   Procedure Laterality Date    COLON RESECTION      Colonic resection due to large polyp    CYSTECTOMY      Resection of cyst at previous site of colonic surg    HEMORRHOIDECTOMY         Family History: History reviewed. No pertinent family history.    Social History:   Social History     Socioeconomic History    Marital status:    Tobacco Use    Smoking status: Never    Smokeless tobacco: Never   Vaping Use    Vaping  Use: Never used   Substance and Sexual Activity    Alcohol use: Not Currently    Drug use: Never    Sexual activity: Defer       Medications:     Current Outpatient Medications:     ALPRAZolam (Xanax) 0.5 MG tablet, Take 1 tablet by mouth 2 (Two) Times a Day As Needed for Anxiety., Disp: 30 tablet, Rfl: 2    metFORMIN ER (GLUCOPHAGE-XR) 750 MG 24 hr tablet, Take 1 tablet by mouth 2 (Two) Times a Day With Meals., Disp: 180 tablet, Rfl: 0    aspirin 81 MG EC tablet, Take 1 tablet by mouth Daily., Disp: , Rfl:     finasteride (PROSCAR) 5 MG tablet, Take 1 tablet by mouth Daily., Disp: , Rfl:     glimepiride (Amaryl) 2 MG tablet, Take 1 tablet by mouth Every Morning Before Breakfast., Disp: 90 tablet, Rfl: 1    glucosamine-chondroitin 500-400 MG capsule capsule, Take  by mouth 3 (Three) Times a Day With Meals., Disp: , Rfl:     glucose blood test strip, Use as instructed, Disp: 50 each, Rfl: 11    lisinopril (PRINIVIL,ZESTRIL) 10 MG tablet, Take 1 tablet by mouth Daily., Disp: 90 tablet, Rfl: 1    methocarbamol (ROBAXIN) 500 MG tablet, Take 1 tablet by mouth 4 (Four) Times a Day As Needed for Muscle Spasms., Disp: 30 tablet, Rfl: 0    metoprolol succinate XL (TOPROL-XL) 50 MG 24 hr tablet, Take 0.5 tablets by mouth Daily., Disp: 90 tablet, Rfl: 1    multivitamin with minerals (ONE-A-DAY 50 PLUS PO), Take 1 tablet by mouth Daily., Disp: , Rfl:     Omega-3 Fatty Acids (fish oil) 1000 MG capsule capsule, Take  by mouth Daily With Breakfast., Disp: , Rfl:     tamsulosin (FLOMAX) 0.4 MG capsule 24 hr capsule, Take 1 capsule by mouth Daily., Disp: , Rfl:     Allergies:   Allergies   Allergen Reactions    Azithromycin Unknown - High Severity    Crestor [Rosuvastatin] Unknown - High Severity    Methylprednisolone Unknown - High Severity    Percocet [Oxycodone-Acetaminophen] Unknown - High Severity    Protonix [Pantoprazole] Unknown - High Severity    Tricor [Fenofibrate] Unknown - High Severity       Objective     Physical  "Exam:  Vital Signs:   Vitals:    12/20/23 1301   BP: 134/86   BP Location: Left arm   Patient Position: Sitting   Cuff Size: Adult   Pulse: 67   SpO2: 97%   Weight: 80.3 kg (177 lb)   Height: 167.6 cm (66\")     Body mass index is 28.57 kg/m².     Physical Exam  Vitals and nursing note reviewed.   Constitutional:       Appearance: Normal appearance.   HENT:      Head: Normocephalic and atraumatic.   Cardiovascular:      Rate and Rhythm: Normal rate and regular rhythm.   Pulmonary:      Effort: Pulmonary effort is normal.      Breath sounds: Normal breath sounds.   Musculoskeletal:         General: Normal range of motion.      Cervical back: Normal range of motion and neck supple.      Right lower leg: No edema.      Left lower leg: No edema.   Skin:     General: Skin is warm and dry.   Neurological:      General: No focal deficit present.      Mental Status: He is alert.         Procedures    PHQ-9 Total Score:       Assessment / Plan      Assessment/Plan:   Diagnoses and all orders for this visit:    1. Type 2 diabetes mellitus with hyperglycemia, without long-term current use of insulin (Primary)  -     metFORMIN ER (GLUCOPHAGE-XR) 750 MG 24 hr tablet; Take 1 tablet by mouth 2 (Two) Times a Day With Meals.  Dispense: 180 tablet; Refill: 0  -     glimepiride (Amaryl) 2 MG tablet; Take 1 tablet by mouth Every Morning Before Breakfast.  Dispense: 90 tablet; Refill: 1  -     Comprehensive Metabolic Panel; Future  -     Hemoglobin A1c; Future  -     CBC Auto Differential; Future    2. Anxiety  -     ALPRAZolam (Xanax) 0.5 MG tablet; Take 1 tablet by mouth 2 (Two) Times a Day As Needed for Anxiety.  Dispense: 30 tablet; Refill: 2    3. High risk medication use    4. Primary hypertension  -     lisinopril (PRINIVIL,ZESTRIL) 10 MG tablet; Take 1 tablet by mouth Daily.  Dispense: 90 tablet; Refill: 1    5. Encounter for hepatitis C screening test for low risk patient  -     Hepatitis C Antibody; Future         Continue with " metformin 750 twice daily as directed and will add in glimepiride 2 mg/day in the morning we discussed signs symptoms and treatment of hypoglycemia in detail with him and his wife.    We discussed SGL 2 inhibitors but they are worried the cost would be prohibitive as they do not have insurance that covers medications.    GFR is 50 will add and lisinopril 10 mg once per day if he gets lightheaded dizzy or passes out obviously needs to let me know immediately or go to the ER.  And may need to taper down on his beta-blocker currently he is only taking a half of a 50 mg tablet once a day.    Follow-up in 3 months or 4 months as they want avoid the winter and will get repeat blood work at that point.     He did sign the consent form Jose Luis was reviewed and is appropriate continue Xanax as directed on a as needed basis      Follow Up:   Return in about 4 months (around 4/16/2024) for Recheck, Labs prior next visit.        Marco Ontiveros MD  [facility]   Portions of note created with Dragon voice recognition technology

## 2024-01-23 DIAGNOSIS — E11.65 TYPE 2 DIABETES MELLITUS WITH HYPERGLYCEMIA, WITHOUT LONG-TERM CURRENT USE OF INSULIN: ICD-10-CM

## 2024-01-23 DIAGNOSIS — F41.9 ANXIETY: ICD-10-CM

## 2024-01-23 RX ORDER — ALPRAZOLAM 0.5 MG/1
0.5 TABLET ORAL 2 TIMES DAILY PRN
Qty: 30 TABLET | Refills: 2 | OUTPATIENT
Start: 2024-01-23

## 2024-01-24 RX ORDER — METFORMIN HYDROCHLORIDE 750 MG/1
750 TABLET, EXTENDED RELEASE ORAL 2 TIMES DAILY WITH MEALS
Qty: 60 TABLET | Refills: 0 | OUTPATIENT
Start: 2024-01-24

## 2024-01-29 DIAGNOSIS — F41.9 ANXIETY: ICD-10-CM

## 2024-01-29 DIAGNOSIS — I10 PRIMARY HYPERTENSION: ICD-10-CM

## 2024-01-29 DIAGNOSIS — E11.65 TYPE 2 DIABETES MELLITUS WITH HYPERGLYCEMIA, WITHOUT LONG-TERM CURRENT USE OF INSULIN: ICD-10-CM

## 2024-01-29 RX ORDER — METFORMIN HYDROCHLORIDE 750 MG/1
750 TABLET, EXTENDED RELEASE ORAL 2 TIMES DAILY WITH MEALS
Qty: 180 TABLET | Refills: 0 | Status: SHIPPED | OUTPATIENT
Start: 2024-01-29

## 2024-01-29 RX ORDER — METOPROLOL SUCCINATE 50 MG
50 TABLET, EXTENDED RELEASE 24 HR ORAL DAILY
Qty: 90 TABLET | Refills: 1 | Status: SHIPPED | OUTPATIENT
Start: 2024-01-29

## 2024-01-29 RX ORDER — ALPRAZOLAM 0.5 MG/1
0.5 TABLET ORAL 2 TIMES DAILY PRN
Qty: 30 TABLET | Refills: 2 | Status: SHIPPED | OUTPATIENT
Start: 2024-01-29

## 2024-01-29 NOTE — TELEPHONE ENCOUNTER
Caller: CHANDRIKA MOTA    Relationship: Emergency Contact    Best call back number: [phone]     Which medication are you concerned about:   -metoprolol succinate XL (TOPROL-XL) 50 MG 24 hr tablet   -metFORMIN ER (GLUCOPHAGE-XR) 750 MG 24 hr tablet   -ALPRAZolam (Xanax) 0.5 MG tablet .    Who prescribed you this medication: Marco Ontiveros MD     When did you start taking this medication: NA    What are your concerns: PATIENT IS OUT OF REFILLS. PATIENT STATES PHARMACY HAS SENT OVER REQUEST BUT THE REQUEST HAVE BEEN DENIED :    Request refused: Request already responded to by other means (e.g. phone or fax)     PATIENT IS REQUESTING A PARTIAL FILL TO COVER HIM UNTIL HIS APPOINTMENT WITH DR ONTIVEROS ON 4/19/24    How long have you had these concerns: 1/23/24    PLEASE CONTACT PATIENT ONCE THIS HAS BEEN CALLED IN OR IF DENIED

## 2024-04-12 ENCOUNTER — LAB (OUTPATIENT)
Dept: FAMILY MEDICINE CLINIC | Facility: CLINIC | Age: 74
End: 2024-04-12
Payer: MEDICARE

## 2024-04-12 DIAGNOSIS — E11.65 TYPE 2 DIABETES MELLITUS WITH HYPERGLYCEMIA, WITHOUT LONG-TERM CURRENT USE OF INSULIN: ICD-10-CM

## 2024-04-12 DIAGNOSIS — Z11.59 ENCOUNTER FOR HEPATITIS C SCREENING TEST FOR LOW RISK PATIENT: ICD-10-CM

## 2024-04-12 PROCEDURE — 36415 COLL VENOUS BLD VENIPUNCTURE: CPT | Performed by: FAMILY MEDICINE

## 2024-04-13 LAB
ALBUMIN SERPL-MCNC: 4.1 G/DL (ref 3.8–4.8)
ALBUMIN/GLOB SERPL: 1.8 {RATIO} (ref 1.2–2.2)
ALP SERPL-CCNC: 61 IU/L (ref 44–121)
ALT SERPL-CCNC: 10 IU/L (ref 0–44)
AST SERPL-CCNC: 13 IU/L (ref 0–40)
BASOPHILS # BLD AUTO: 0.1 X10E3/UL (ref 0–0.2)
BASOPHILS NFR BLD AUTO: 1 %
BILIRUB SERPL-MCNC: 0.3 MG/DL (ref 0–1.2)
BUN SERPL-MCNC: 28 MG/DL (ref 8–27)
BUN/CREAT SERPL: 19 (ref 10–24)
CALCIUM SERPL-MCNC: 9.3 MG/DL (ref 8.6–10.2)
CHLORIDE SERPL-SCNC: 103 MMOL/L (ref 96–106)
CO2 SERPL-SCNC: 25 MMOL/L (ref 20–29)
CREAT SERPL-MCNC: 1.47 MG/DL (ref 0.76–1.27)
EGFRCR SERPLBLD CKD-EPI 2021: 49 ML/MIN/1.73
EOSINOPHIL # BLD AUTO: 0.3 X10E3/UL (ref 0–0.4)
EOSINOPHIL NFR BLD AUTO: 4 %
ERYTHROCYTE [DISTWIDTH] IN BLOOD BY AUTOMATED COUNT: 12.6 % (ref 11.6–15.4)
GLOBULIN SER CALC-MCNC: 2.3 G/DL (ref 1.5–4.5)
GLUCOSE SERPL-MCNC: 149 MG/DL (ref 70–99)
HBA1C MFR BLD: 7.7 % (ref 4.8–5.6)
HCT VFR BLD AUTO: 40.4 % (ref 37.5–51)
HCV IGG SERPL QL IA: NON REACTIVE
HGB BLD-MCNC: 13.5 G/DL (ref 13–17.7)
IMM GRANULOCYTES # BLD AUTO: 0 X10E3/UL (ref 0–0.1)
IMM GRANULOCYTES NFR BLD AUTO: 0 %
LYMPHOCYTES # BLD AUTO: 2 X10E3/UL (ref 0.7–3.1)
LYMPHOCYTES NFR BLD AUTO: 31 %
MCH RBC QN AUTO: 28.7 PG (ref 26.6–33)
MCHC RBC AUTO-ENTMCNC: 33.4 G/DL (ref 31.5–35.7)
MCV RBC AUTO: 86 FL (ref 79–97)
MONOCYTES # BLD AUTO: 0.5 X10E3/UL (ref 0.1–0.9)
MONOCYTES NFR BLD AUTO: 8 %
NEUTROPHILS # BLD AUTO: 3.6 X10E3/UL (ref 1.4–7)
NEUTROPHILS NFR BLD AUTO: 56 %
PLATELET # BLD AUTO: 202 X10E3/UL (ref 150–450)
POTASSIUM SERPL-SCNC: 4.8 MMOL/L (ref 3.5–5.2)
PROT SERPL-MCNC: 6.4 G/DL (ref 6–8.5)
RBC # BLD AUTO: 4.7 X10E6/UL (ref 4.14–5.8)
SODIUM SERPL-SCNC: 139 MMOL/L (ref 134–144)
WBC # BLD AUTO: 6.4 X10E3/UL (ref 3.4–10.8)

## 2024-04-19 ENCOUNTER — OFFICE VISIT (OUTPATIENT)
Dept: FAMILY MEDICINE CLINIC | Facility: CLINIC | Age: 74
End: 2024-04-19
Payer: MEDICARE

## 2024-04-19 VITALS
BODY MASS INDEX: 27.97 KG/M2 | HEART RATE: 67 BPM | HEIGHT: 66 IN | SYSTOLIC BLOOD PRESSURE: 104 MMHG | OXYGEN SATURATION: 97 % | DIASTOLIC BLOOD PRESSURE: 68 MMHG | WEIGHT: 174 LBS

## 2024-04-19 DIAGNOSIS — F41.9 ANXIETY: ICD-10-CM

## 2024-04-19 DIAGNOSIS — E11.65 TYPE 2 DIABETES MELLITUS WITH HYPERGLYCEMIA, WITHOUT LONG-TERM CURRENT USE OF INSULIN: ICD-10-CM

## 2024-04-19 DIAGNOSIS — M79.642 PAIN OF LEFT HAND: Primary | ICD-10-CM

## 2024-04-19 LAB — POC MICROALBUMIN URINE: 20

## 2024-04-19 RX ORDER — METFORMIN HYDROCHLORIDE 750 MG/1
750 TABLET, EXTENDED RELEASE ORAL 2 TIMES DAILY WITH MEALS
Qty: 180 TABLET | Refills: 0 | Status: SHIPPED | OUTPATIENT
Start: 2024-04-19

## 2024-04-19 RX ORDER — ALPRAZOLAM 0.5 MG/1
0.5 TABLET ORAL 2 TIMES DAILY PRN
Qty: 30 TABLET | Refills: 2 | Status: SHIPPED | OUTPATIENT
Start: 2024-04-19

## 2024-04-19 NOTE — PROGRESS NOTES
Follow Up Office Visit      Patient Name: Greyson Eagle  : 1950   MRN: [id number]     Chief Complaint:    Chief Complaint   Patient presents with    Follow-up       History of Present Illness: Greyson Eagle is a 73 y.o. male who is here today to   follow-up on his diabetes anxiety allergies and go over blood work.  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   Respiratory: Negative for cough and shortness of breath.    Cardiovascular: Negative for chest pain and palpitations.   Skin: Negative for rash or itching      Subjective      Review of Systems:   Review of Systems    Past Medical History:   Past Medical History:   Diagnosis Date    Allergic rhinitis     Anxiety     Benign essential hypertension     Carotid artery disease     Modwerate carotid disease noted at vascular screening    Diabetes     Mixed hyperlipidemia        Past Surgical History:   Past Surgical History:   Procedure Laterality Date    COLON RESECTION      Colonic resection due to large polyp    CYSTECTOMY      Resection of cyst at previous site of colonic surg    HEMORRHOIDECTOMY         Family History: History reviewed. No pertinent family history.    Social History:   Social History     Socioeconomic History    Marital status:    Tobacco Use    Smoking status: Never    Smokeless tobacco: Never   Vaping Use    Vaping status: Never Used   Substance and Sexual Activity    Alcohol use: Not Currently    Drug use: Never    Sexual activity: Defer       Medications:     Current Outpatient Medications:     ALPRAZolam (Xanax) 0.5 MG tablet, Take 1 tablet by mouth 2 (Two) Times a Day As Needed for Anxiety., Disp: 30 tablet, Rfl: 2    metFORMIN ER (GLUCOPHAGE-XR) 750 MG 24 hr tablet, Take 1 tablet by mouth 2 (Two)  "Times a Day With Meals., Disp: 180 tablet, Rfl: 0    aspirin 81 MG EC tablet, Take 1 tablet by mouth Daily., Disp: , Rfl:     finasteride (PROSCAR) 5 MG tablet, Take 1 tablet by mouth Daily., Disp: , Rfl:     glimepiride (Amaryl) 2 MG tablet, Take 1 tablet by mouth Every Morning Before Breakfast., Disp: 90 tablet, Rfl: 1    glucosamine-chondroitin 500-400 MG capsule capsule, Take  by mouth 3 (Three) Times a Day With Meals., Disp: , Rfl:     glucose blood test strip, Use as instructed, Disp: 50 each, Rfl: 11    lisinopril (PRINIVIL,ZESTRIL) 10 MG tablet, Take 1 tablet by mouth Daily., Disp: 90 tablet, Rfl: 1    metoprolol succinate XL (TOPROL-XL) 50 MG 24 hr tablet, Take 0.5 tablets by mouth Daily., Disp: 90 tablet, Rfl: 1    multivitamin with minerals (ONE-A-DAY 50 PLUS PO), Take 1 tablet by mouth Daily., Disp: , Rfl:     Omega-3 Fatty Acids (fish oil) 1000 MG capsule capsule, Take  by mouth Daily With Breakfast., Disp: , Rfl:     tamsulosin (FLOMAX) 0.4 MG capsule 24 hr capsule, Take 1 capsule by mouth Daily., Disp: , Rfl:     Toprol XL 50 MG 24 hr tablet, Take 1 tablet by mouth Daily., Disp: 90 tablet, Rfl: 1    Allergies:   Allergies   Allergen Reactions    Azithromycin Unknown - High Severity    Crestor [Rosuvastatin] Unknown - High Severity    Methylprednisolone Unknown - High Severity    Percocet [Oxycodone-Acetaminophen] Unknown - High Severity    Protonix [Pantoprazole] Unknown - High Severity    Tricor [Fenofibrate] Unknown - High Severity       Objective     Physical Exam:  Vital Signs:   Vitals:    04/19/24 1303   BP: 104/68   BP Location: Right arm   Patient Position: Sitting   Cuff Size: Adult   Pulse: 67   SpO2: 97%   Weight: 78.9 kg (174 lb)   Height: 167.6 cm (66\")     Facility age limit for growth %jose is 20 years.  Body mass index is 28.08 kg/m².     Physical Exam  Vitals and nursing note reviewed.   Constitutional:       Appearance: Normal appearance.   HENT:      Head: Normocephalic and " atraumatic.      Nose: Nose normal.   Cardiovascular:      Rate and Rhythm: Normal rate and regular rhythm.   Pulmonary:      Effort: Pulmonary effort is normal.      Breath sounds: Normal breath sounds.   Musculoskeletal:         General: Normal range of motion.      Cervical back: Normal range of motion and neck supple.      Right lower leg: No edema.      Left lower leg: No edema.      Comments: Does have large hands osteoarthritic looking joints.  Little bit of tenderness to palpation of the third distal to phalanx sees on the left hand but has fairly good range of motion both hands.   Skin:     General: Skin is warm and dry.      Comments: He does have what appears to be a brown seborrheic keratoses on the left temple he follows up with dermatology every 6 months he will have them evaluate this.   Neurological:      General: No focal deficit present.      Mental Status: He is alert.   Psychiatric:         Mood and Affect: Mood normal.         Behavior: Behavior normal.         Procedures    PHQ-9 Total Score:       Assessment / Plan      Assessment/Plan:   Diagnoses and all orders for this visit:    1. Pain of left hand (Primary)  -     XR Hand 3+ View Left; Future    2. Anxiety  -     ALPRAZolam (Xanax) 0.5 MG tablet; Take 1 tablet by mouth 2 (Two) Times a Day As Needed for Anxiety.  Dispense: 30 tablet; Refill: 2    3. Type 2 diabetes mellitus with hyperglycemia, without long-term current use of insulin  -     metFORMIN ER (GLUCOPHAGE-XR) 750 MG 24 hr tablet; Take 1 tablet by mouth 2 (Two) Times a Day With Meals.  Dispense: 180 tablet; Refill: 0  -     Comprehensive Metabolic Panel; Future  -     Hemoglobin A1c; Future    Labs reviewed with him A1c is the same 7.7      Will get an x-ray of the left hand and finger call for x-ray report    Refilled his Xanax he signed the consent form Jose Luis is appropriate he has been stable and use    For his diabetes continue with metformin good diet and exercise as  directed.  He admits that he did not take his Amaryl as directed he thought if his sugar was low like 1/21/2016 in the morning he would not take his medicine.  He was afraid it would drop his sugar too much.  I told him to continue to take the medicine as directed if he has any low sugars less than 60 or over 400 to give me a call    Will follow-up in 3 months with repeat blood work we did write orders for blood work that he can get up there in his home city.    Follow-up on x-ray reports as directed    He declined any immunizations today says he never gets flu shots or etc.     Microalbumin 20    Follow Up:   Return in about 3 months (around 7/19/2024) for Recheck.        Marco Ontiveros MD  [facility]   Portions of note created with Dragon voice recognition technology

## 2024-04-19 NOTE — LETTER
*UDS UTD 2023 Controlled Substance Prescribing Agreement          I, Greyson Eagle [PATIENT],  1950 [] a patient of  Marco Ontiveros MD   [PROVIDER] at [facility] [PRACTICE], have been informed that  individuals who are prescribed certain Controlled Substances including, but not limited to, narcotic pain medicines, stimulants, benzodiazepine tranquilizers, and barbiturate sedatives, can abuse those substances or may allow abuse by others, and have some risk of developing an addictive disorder or suffering a relapse of a prior addiction. Therefore, I have been informed that it is necessary to observe strict rules pertaining to their use, and I agree to follow the terms and procedures described in this Agreement as consideration for, and as a condition of, the willingness of the physician whose signature appears below to consider prescribing or to continue prescribing Controlled Substances to treat my pain.     1. I will inform my physician of any current or past substance abuse, or any current or past substance abuse of any immediate member of my immediate family.     2. I agree that I may be subject to a voluntary evaluation by psychologists and/or psychiatrists, possibly at my own expense, before any Controlled Substances will be prescribed to me. I agree that the need to be evaluated by psychologists and/or psychiatrists may be revisited every three (3) to six (6) months thereafter while taking the medication.     3. All Controlled Substances must come from a provider in the PROVIDER’S PRACTICE. My Controlled Substances will come from the PROVIDER whose signature appears below, or during his or her absence, by the covering provider, unless specific written authorization is obtained from the office for an exception.     4. I will obtain all Controlled Substances from the same pharmacy. Should the need arise to change pharmacies, I will inform the PROVIDER’S office.     5. I  will inform the PROVIDER’S office of any new medications or medical conditions, and of any adverse effects I experience from any of the medications that I take.     6. I will inform my other health care providers that I am taking the Controlled Substances listed above, and of the existence of this Agreement. In the event of an emergency, I will provide the foregoing information to emergency department providers.     7. I agree that my prescribing PROVIDER has permission to discuss all diagnostic and treatment details with other health care providers, pharmacists, or other professionals who provide my health care regarding my use of Controlled Substances for purposes of maintaining accountability.     8. I will not allow anyone else to have, use sell, or otherwise have access to these medications. The sharing of medications with anyone is absolutely forbidden and is against the law.         9. I understand that Controlled Substances may be hazardous or lethal to a person who is not tolerant to their effects, especially a child, and that I must keep them out of reach of such people for their own safety.     10. I understand that tampering with a written prescription is a felony and I will not change or tamper with the PROVIDER’S written prescription.     11. I am aware that attempting to obtain a Controlled Substance under false pretenses is illegal.     12. I agree not to alter my medication in any way, and I will take my medication whole, and it will not be broken, chewed, crushed, injected, or snorted.     13. I will take my medication as instructed and prescribed, and I will not exceed the maximum prescribed dose. Any change in dosage must be approved by the PROVIDER or a physician within the PRACTICE.     14. I understand that these drugs should not be stopped abruptly, as withdrawal syndromes may develop.     15. I will cooperate with unannounced urine or serum toxicology screenings as may be requested, as well  as any random pill counts of medication by the PROVIDER. Failure to comply may result in termination of the PROVIDER-patient relationship.     16. I understand that the presence of unauthorized and/or illegal substances in the screenings described in the paragraph above may prompt referral for assessment for a substance abuse disorder or termination of the PROVIDER-patient relationship.     17. I understand that medications may not be replaced if they are lost, damaged, or stolen. If any of these situations arise that cause me to request an early refill of my medication, a copy of a filed police report or a statement from me explaining the circumstances may be required before additional prescriptions are considered. If I request an early refill secondary to lost, damaged, or stolen prescriptions twice within a year, I may be discharged from the practice.     18. I understand that a prescription may be given early if the PROVIDER or the patient will be out of town when the refill is due. These prescriptions will contain instructions to the pharmacist that the prescriptions(s) may not be filled prior to the appropriate date.     19. If the responsible legal authorities have questions concerning my treatment, as may occur, for example, if I obtained medication at several pharmacies, all confidentiality is waived, and these authorities may be given full access to my full records of Controlled Substances administration.     20. I will keep my scheduled appointments in order to receive medication renewals. If I need to cancel my appointment, I will do so a minimum of twenty-four (24) hours before it is scheduled.     21. I understand that I may be asked to bring my medications in their original container to the PROVIDER’s office while I am on controlled medication.     22. Refills generally will not be given over the phone, after office hours, during the weekends, and on holidays.     23. I understand that any medical  treatment is initially a trial, with the goal of treatment being to improve the quality of life and ability to function and/or work. These parameters will be assessed periodically to determine the benefits of continued therapy, and continued prescription is contingent on whether my physician believes that the medication usage benefits me. I will comply with all treatments as outlined by the PROVIDER.     24. I have been explained the risks and potential benefits of these therapies, including, but not limited to, psychological addiction, physical dependence, withdrawal and over dosage.     25. I understand that failure to adhere to these policies and/or failure to comply with the PROVIDER’S treatment plan may result in cessation of therapy with Controlled Substance prescribing by the PROVIDER or referral for further specialty assessment, as well as possible discharge from the PRACTICE.     26. I, the undersigned patient, attest that the foregoing was discussed with me, and that I have read, fully understand, and agree to all of the above requirements and instructions. I affirm that I have the full right and power to sign and be bound by this  Agreement.         Date: 4/19/2024    Patient Printed Name:  Greyson Eagle 1950  Patient Signature:  ________________________________           Date: 4/19/2024    Provider Signature:  _______________________________

## 2024-04-23 ENCOUNTER — TELEPHONE (OUTPATIENT)
Dept: FAMILY MEDICINE CLINIC | Facility: CLINIC | Age: 74
End: 2024-04-23
Payer: MEDICARE

## 2024-04-23 NOTE — TELEPHONE ENCOUNTER
PATIENT HAS CALLED REQUESTING A CALL BACK WITH RESULTS OF XRAY OF HAND THAT DONE 04/19/24.    CALL BACK NUMBER [phone]

## 2024-04-23 NOTE — TELEPHONE ENCOUNTER
Patient called and let him know. He said he will discuss it at his July appointment.      Marco Ontiveros MD  4/22/2024  5:23 PM EDT       Your labs/ tests are abnormal --arthritis throughout  please make appointment to discuss.

## 2024-06-14 ENCOUNTER — TELEPHONE (OUTPATIENT)
Dept: FAMILY MEDICINE CLINIC | Facility: CLINIC | Age: 74
End: 2024-06-14
Payer: MEDICARE

## 2024-06-14 NOTE — TELEPHONE ENCOUNTER
Caller: CHANDRIKA MOTA    Relationship: Emergency Contact    Best call back number:     Which medication are you concerned about: CALLED STATED THAT SHE WAS TOLD THAT [facility] DOES NOT HAVE FORMULARY FOR RX  metoprolol succinate XL (TOPROL-XL) 50 MG 24 hr tablet [01957] (Order [id number])     PLEASE ADVISE.

## 2024-06-17 NOTE — TELEPHONE ENCOUNTER
Tried to call patient to see if he has a Medicare replacement card or a prescription card. No answer and no vm set up. Patients wife's number doesn't ring just gives a welcome to Rey.

## 2024-07-02 ENCOUNTER — TELEPHONE (OUTPATIENT)
Dept: FAMILY MEDICINE CLINIC | Facility: CLINIC | Age: 74
End: 2024-07-02

## 2024-07-02 NOTE — TELEPHONE ENCOUNTER
Caller: [facility]    Relationship: Other    Best call back number: [phone]    Which medication are you concerned about:     metoprolol succinate XL (TOPROL-XL) 50 MG 24 hr tablet       Who prescribed you this medication: DR VEGA    When did you start taking this medication: ONGOING    What are your concerns: ASKING IF THE PATIENT HAS TRIED ANY OF THE OTHER MEDICATIONS BESIDES THE TOPROL (ALTERNATIVE FORMULARITIES)

## 2024-07-09 NOTE — TELEPHONE ENCOUNTER
SPOKE WITH PATIENTS WIFE SHE STATED THE RECEIVED A LETTER FROM THE INSURANCE COMPANY THAT THEY'LL PAY FOR THE METOPROLOL SUCCINATE.

## 2024-07-24 ENCOUNTER — OFFICE VISIT (OUTPATIENT)
Dept: FAMILY MEDICINE CLINIC | Facility: CLINIC | Age: 74
End: 2024-07-24
Payer: MEDICARE

## 2024-07-24 VITALS
SYSTOLIC BLOOD PRESSURE: 132 MMHG | WEIGHT: 171 LBS | DIASTOLIC BLOOD PRESSURE: 74 MMHG | BODY MASS INDEX: 27.48 KG/M2 | HEART RATE: 55 BPM | OXYGEN SATURATION: 97 % | HEIGHT: 66 IN

## 2024-07-24 DIAGNOSIS — E11.65 TYPE 2 DIABETES MELLITUS WITH HYPERGLYCEMIA, WITHOUT LONG-TERM CURRENT USE OF INSULIN: ICD-10-CM

## 2024-07-24 DIAGNOSIS — I10 PRIMARY HYPERTENSION: ICD-10-CM

## 2024-07-24 DIAGNOSIS — F41.9 ANXIETY: ICD-10-CM

## 2024-07-24 PROCEDURE — 99214 OFFICE O/P EST MOD 30 MIN: CPT | Performed by: FAMILY MEDICINE

## 2024-07-24 PROCEDURE — 1126F AMNT PAIN NOTED NONE PRSNT: CPT | Performed by: FAMILY MEDICINE

## 2024-07-24 PROCEDURE — 1160F RVW MEDS BY RX/DR IN RCRD: CPT | Performed by: FAMILY MEDICINE

## 2024-07-24 PROCEDURE — G2211 COMPLEX E/M VISIT ADD ON: HCPCS | Performed by: FAMILY MEDICINE

## 2024-07-24 PROCEDURE — 1159F MED LIST DOCD IN RCRD: CPT | Performed by: FAMILY MEDICINE

## 2024-07-24 PROCEDURE — 3051F HG A1C>EQUAL 7.0%<8.0%: CPT | Performed by: FAMILY MEDICINE

## 2024-07-24 RX ORDER — LISINOPRIL 10 MG/1
10 TABLET ORAL DAILY
Qty: 90 TABLET | Refills: 1 | Status: SHIPPED | OUTPATIENT
Start: 2024-07-24

## 2024-07-24 RX ORDER — METFORMIN HYDROCHLORIDE 750 MG/1
750 TABLET, EXTENDED RELEASE ORAL 2 TIMES DAILY WITH MEALS
Qty: 180 TABLET | Refills: 0 | Status: SHIPPED | OUTPATIENT
Start: 2024-07-24

## 2024-07-24 RX ORDER — ALPRAZOLAM 0.5 MG/1
0.5 TABLET ORAL 2 TIMES DAILY PRN
Qty: 30 TABLET | Refills: 2 | Status: SHIPPED | OUTPATIENT
Start: 2024-07-24

## 2024-07-24 NOTE — LETTER
*UDS UTD*  Controlled Substance Prescribing Agreement          I, Greyson Eagle [PATIENT],  1950 [] a patient of  Marco Ontiveros MD   [PROVIDER] at [facility] [PRACTICE], have been informed that  individuals who are prescribed certain Controlled Substances including, but not limited to, narcotic pain medicines, stimulants, benzodiazepine tranquilizers, and barbiturate sedatives, can abuse those substances or may allow abuse by others, and have some risk of developing an addictive disorder or suffering a relapse of a prior addiction. Therefore, I have been informed that it is necessary to observe strict rules pertaining to their use, and I agree to follow the terms and procedures described in this Agreement as consideration for, and as a condition of, the willingness of the physician whose signature appears below to consider prescribing or to continue prescribing Controlled Substances to treat my pain.     1. I will inform my physician of any current or past substance abuse, or any current or past substance abuse of any immediate member of my immediate family.     2. I agree that I may be subject to a voluntary evaluation by psychologists and/or psychiatrists, possibly at my own expense, before any Controlled Substances will be prescribed to me. I agree that the need to be evaluated by psychologists and/or psychiatrists may be revisited every three (3) to six (6) months thereafter while taking the medication.     3. All Controlled Substances must come from a provider in the PROVIDER’S PRACTICE. My Controlled Substances will come from the PROVIDER whose signature appears below, or during his or her absence, by the covering provider, unless specific written authorization is obtained from the office for an exception.     4. I will obtain all Controlled Substances from the same pharmacy. Should the need arise to change pharmacies, I will inform the PROVIDER’S office.     5. I will  inform the PROVIDER’S office of any new medications or medical conditions, and of any adverse effects I experience from any of the medications that I take.     6. I will inform my other health care providers that I am taking the Controlled Substances listed above, and of the existence of this Agreement. In the event of an emergency, I will provide the foregoing information to emergency department providers.     7. I agree that my prescribing PROVIDER has permission to discuss all diagnostic and treatment details with other health care providers, pharmacists, or other professionals who provide my health care regarding my use of Controlled Substances for purposes of maintaining accountability.     8. I will not allow anyone else to have, use sell, or otherwise have access to these medications. The sharing of medications with anyone is absolutely forbidden and is against the law.         9. I understand that Controlled Substances may be hazardous or lethal to a person who is not tolerant to their effects, especially a child, and that I must keep them out of reach of such people for their own safety.     10. I understand that tampering with a written prescription is a felony and I will not change or tamper with the PROVIDER’S written prescription.     11. I am aware that attempting to obtain a Controlled Substance under false pretenses is illegal.     12. I agree not to alter my medication in any way, and I will take my medication whole, and it will not be broken, chewed, crushed, injected, or snorted.     13. I will take my medication as instructed and prescribed, and I will not exceed the maximum prescribed dose. Any change in dosage must be approved by the PROVIDER or a physician within the PRACTICE.     14. I understand that these drugs should not be stopped abruptly, as withdrawal syndromes may develop.     15. I will cooperate with unannounced urine or serum toxicology screenings as may be requested, as well as  any random pill counts of medication by the PROVIDER. Failure to comply may result in termination of the PROVIDER-patient relationship.     16. I understand that the presence of unauthorized and/or illegal substances in the screenings described in the paragraph above may prompt referral for assessment for a substance abuse disorder or termination of the PROVIDER-patient relationship.     17. I understand that medications may not be replaced if they are lost, damaged, or stolen. If any of these situations arise that cause me to request an early refill of my medication, a copy of a filed police report or a statement from me explaining the circumstances may be required before additional prescriptions are considered. If I request an early refill secondary to lost, damaged, or stolen prescriptions twice within a year, I may be discharged from the practice.     18. I understand that a prescription may be given early if the PROVIDER or the patient will be out of town when the refill is due. These prescriptions will contain instructions to the pharmacist that the prescriptions(s) may not be filled prior to the appropriate date.     19. If the responsible legal authorities have questions concerning my treatment, as may occur, for example, if I obtained medication at several pharmacies, all confidentiality is waived, and these authorities may be given full access to my full records of Controlled Substances administration.     20. I will keep my scheduled appointments in order to receive medication renewals. If I need to cancel my appointment, I will do so a minimum of twenty-four (24) hours before it is scheduled.     21. I understand that I may be asked to bring my medications in their original container to the PROVIDER’s office while I am on controlled medication.     22. Refills generally will not be given over the phone, after office hours, during the weekends, and on holidays.     23. I understand that any medical  treatment is initially a trial, with the goal of treatment being to improve the quality of life and ability to function and/or work. These parameters will be assessed periodically to determine the benefits of continued therapy, and continued prescription is contingent on whether my physician believes that the medication usage benefits me. I will comply with all treatments as outlined by the PROVIDER.     24. I have been explained the risks and potential benefits of these therapies, including, but not limited to, psychological addiction, physical dependence, withdrawal and over dosage.     25. I understand that failure to adhere to these policies and/or failure to comply with the PROVIDER’S treatment plan may result in cessation of therapy with Controlled Substance prescribing by the PROVIDER or referral for further specialty assessment, as well as possible discharge from the PRACTICE.     26. I, the undersigned patient, attest that the foregoing was discussed with me, and that I have read, fully understand, and agree to all of the above requirements and instructions. I affirm that I have the full right and power to sign and be bound by this  Agreement.         Date:  7/24/2024    Patient Printed Name: Greyson Fco 1950    Patient Signature:  ________________________________           Date: 7/24/2024    Provider Printed Name: Marco Ontiveros MD    Provider Signature:  _______________________________

## 2024-07-24 NOTE — PROGRESS NOTES
Follow Up Office Visit      Patient Name: Greyson Eagle  : 1950   MRN: [id number]     Chief Complaint:    Chief Complaint   Patient presents with    Follow-up       History of Present Illness: Greyson Eagle is a 73 y.o. male who is here today to   follow-up on his diabetes and anxiety and to go over blood work.  His A1c was 7.7  Creatinine 1.67 with a GFR of 42 which is a tad bit worse than previous    He relates that he has difficulty was with his hands falling asleep get numb and tingly if he drives long time or if he is out driving the tractor they get better if he moves his hands or changes position.    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  No direct trauma to the arm he thinks he just withdrew it and pulled it back when he missed hit the sledge with his right hand    Review of Systems   Constitutional: Negative for fatigue and fever.   Respiratory: Negative for cough and shortness of breath.    Cardiovascular: Negative for chest pain and palpitations.   Skin: Negative for rash or itching      He has had a bit of stress as his wife reported that his sister now has stage IV pancreatic cancer in their son has been gone for 2 years and they have not spoken with him.    Subjective      Review of Systems:   Review of Systems    Past Medical History:   Past Medical History:   Diagnosis Date    Allergic rhinitis     Anxiety     Benign essential hypertension     Carotid artery disease     Modwerate carotid disease noted at vascular screening    Diabetes     Mixed hyperlipidemia        Past Surgical History:   Past Surgical History:   Procedure Laterality Date    COLON RESECTION      Colonic resection due to large polyp    CYSTECTOMY      Resection of  cyst at previous site of colonic surg    HEMORRHOIDECTOMY         Family History: History reviewed. No pertinent family history.    Social History:   Social History     Socioeconomic History    Marital status:    Tobacco Use    Smoking status: Never    Smokeless tobacco: Never   Vaping Use    Vaping status: Never Used   Substance and Sexual Activity    Alcohol use: Not Currently    Drug use: Never    Sexual activity: Defer       Medications:     Current Outpatient Medications:     ALPRAZolam (Xanax) 0.5 MG tablet, Take 1 tablet by mouth 2 (Two) Times a Day As Needed for Anxiety., Disp: 30 tablet, Rfl: 2    lisinopril (PRINIVIL,ZESTRIL) 10 MG tablet, Take 1 tablet by mouth Daily., Disp: 90 tablet, Rfl: 1    metFORMIN ER (GLUCOPHAGE-XR) 750 MG 24 hr tablet, Take 1 tablet by mouth 2 (Two) Times a Day With Meals., Disp: 180 tablet, Rfl: 0    aspirin 81 MG EC tablet, Take 1 tablet by mouth Daily., Disp: , Rfl:     empagliflozin (Jardiance) 25 MG tablet tablet, Take 1 tablet by mouth Daily., Disp: 30 tablet, Rfl: 3    finasteride (PROSCAR) 5 MG tablet, Take 1 tablet by mouth Daily., Disp: , Rfl:     glucosamine-chondroitin 500-400 MG capsule capsule, Take  by mouth 3 (Three) Times a Day With Meals., Disp: , Rfl:     glucose blood test strip, Use as instructed, Disp: 50 each, Rfl: 11    multivitamin with minerals (ONE-A-DAY 50 PLUS PO), Take 1 tablet by mouth Daily., Disp: , Rfl:     Omega-3 Fatty Acids (fish oil) 1000 MG capsule capsule, Take  by mouth Daily With Breakfast., Disp: , Rfl:     tamsulosin (FLOMAX) 0.4 MG capsule 24 hr capsule, Take 1 capsule by mouth Daily., Disp: , Rfl:     Toprol XL 50 MG 24 hr tablet, Take 1 tablet by mouth Daily., Disp: 90 tablet, Rfl: 1    Allergies:   Allergies   Allergen Reactions    Azithromycin Unknown - High Severity    Crestor [Rosuvastatin] Unknown - High Severity    Methylprednisolone Unknown - High Severity    Percocet [Oxycodone-Acetaminophen] Unknown - High Severity     "Protonix [Pantoprazole] Unknown - High Severity    Tricor [Fenofibrate] Unknown - High Severity       Objective     Physical Exam:  Vital Signs:   Vitals:    07/24/24 1312   BP: 132/74   BP Location: Right arm   Patient Position: Sitting   Cuff Size: Adult   Pulse: 55   SpO2: 97%   Weight: 77.6 kg (171 lb)   Height: 167.6 cm (66\")     Facility age limit for growth %jose is 20 years.  Body mass index is 27.6 kg/m².     Physical Exam  Vitals and nursing note reviewed.   Constitutional:       Appearance: Normal appearance.   HENT:      Head: Normocephalic and atraumatic.      Nose: Nose normal.   Cardiovascular:      Rate and Rhythm: Normal rate and regular rhythm.   Pulmonary:      Effort: Pulmonary effort is normal.      Breath sounds: Normal breath sounds.   Musculoskeletal:         General: Normal range of motion.      Cervical back: Normal range of motion and neck supple.      Right lower leg: No edema.      Left lower leg: No edema.      Comments: 2+ radial pulses bilaterally good  strength bilaterally full range of motion left shoulder palpation of the arms and forearms bilaterally is unremarkable no bony tenderness /deformity   Skin:     General: Skin is warm and dry.   Neurological:      General: No focal deficit present.      Mental Status: He is alert.   Psychiatric:         Mood and Affect: Mood normal.         Behavior: Behavior normal.         Procedures    PHQ-9 Total Score:       Assessment / Plan      Assessment/Plan:   Diagnoses and all orders for this visit:    1. Primary hypertension  -     lisinopril (PRINIVIL,ZESTRIL) 10 MG tablet; Take 1 tablet by mouth Daily.  Dispense: 90 tablet; Refill: 1    2. Type 2 diabetes mellitus with hyperglycemia, without long-term current use of insulin  -     metFORMIN ER (GLUCOPHAGE-XR) 750 MG 24 hr tablet; Take 1 tablet by mouth 2 (Two) Times a Day With Meals.  Dispense: 180 tablet; Refill: 0    3. Anxiety  -     ALPRAZolam (Xanax) 0.5 MG tablet; Take 1 tablet " by mouth 2 (Two) Times a Day As Needed for Anxiety.  Dispense: 30 tablet; Refill: 2    Other orders  -     empagliflozin (Jardiance) 25 MG tablet tablet; Take 1 tablet by mouth Daily.  Dispense: 30 tablet; Refill: 3         For blood pressure we will continue with lisinopril as directed in addition to his half metoprolol tablet    For diabetes continue with the metformin 750 we will add in Jardiance as he relates that his pharmacist had mentioned that medicine to him and I told him this would help with his kidney function also as it is indicated for CKD 3 and I instructed him to keep his penis clean and dry for any signs of infection like burning or stinging on urination or itching or discharge or any erythema around the penis or genital region he will let me know and stop the medicine immediately  Encouraged him to try to cut back on his carbs    I encouraged him to continue to modify his diet avoid concentrated sweets cut back on carbs and if he can walk or do a recumbent bike for exercise 30 to 60 minutes 5 days a week that would be great.    Will follow-up in 3 months as directed by the law regarding his controlled substance ---he signed the consent/agreement continue with Xanax as directed Jose Luis reviewed and UDS is up-to-date    Regarding the hand numbness tingling told him just continue changing positions if it gets worse or he wants investigation we can do carpal tunnel studies EMGs etc. later he will let me know    For left arm strain he says it is better with the Robaxin I suspect he just strained it when he jerked the steak when he miss hit it with a sledge and if not better or any worse he will return  BMI is >= 25 and <30. (Overweight) The following options were offered after discussion;: exercise counseling/recommendations and nutrition counseling/recommendations      Follow Up:   Return in about 3 months (around 10/24/2024) for Recheck.        Marco Ontiveros MD  [facility]    Portions of note created with Dragon voice recognition technology

## 2024-10-31 ENCOUNTER — OFFICE VISIT (OUTPATIENT)
Dept: FAMILY MEDICINE CLINIC | Facility: CLINIC | Age: 74
End: 2024-10-31
Payer: MEDICARE

## 2024-10-31 VITALS
HEART RATE: 86 BPM | OXYGEN SATURATION: 97 % | WEIGHT: 167 LBS | SYSTOLIC BLOOD PRESSURE: 110 MMHG | HEIGHT: 66 IN | DIASTOLIC BLOOD PRESSURE: 62 MMHG | BODY MASS INDEX: 26.84 KG/M2

## 2024-10-31 DIAGNOSIS — E11.22 TYPE 2 DIABETES MELLITUS WITH STAGE 3A CHRONIC KIDNEY DISEASE, WITHOUT LONG-TERM CURRENT USE OF INSULIN: ICD-10-CM

## 2024-10-31 DIAGNOSIS — F41.9 ANXIETY: ICD-10-CM

## 2024-10-31 DIAGNOSIS — N18.31 TYPE 2 DIABETES MELLITUS WITH STAGE 3A CHRONIC KIDNEY DISEASE, WITHOUT LONG-TERM CURRENT USE OF INSULIN: ICD-10-CM

## 2024-10-31 DIAGNOSIS — I10 PRIMARY HYPERTENSION: ICD-10-CM

## 2024-10-31 DIAGNOSIS — E11.65 TYPE 2 DIABETES MELLITUS WITH HYPERGLYCEMIA, WITHOUT LONG-TERM CURRENT USE OF INSULIN: ICD-10-CM

## 2024-10-31 DIAGNOSIS — Z00.00 ENCOUNTER FOR SUBSEQUENT ANNUAL WELLNESS VISIT (AWV) IN MEDICARE PATIENT: Primary | ICD-10-CM

## 2024-10-31 DIAGNOSIS — Z79.899 HIGH RISK MEDICATION USE: ICD-10-CM

## 2024-10-31 DIAGNOSIS — E78.5 HYPERLIPIDEMIA, UNSPECIFIED HYPERLIPIDEMIA TYPE: ICD-10-CM

## 2024-10-31 LAB
EXPIRATION DATE: ABNORMAL
HBA1C MFR BLD: 6.8 % (ref 4.5–5.7)
Lab: ABNORMAL
POC AMPHETAMINES: NEGATIVE
POC BARBITURATES: NEGATIVE
POC BENZODIAZEPHINES: POSITIVE
POC COCAINE: NEGATIVE
POC METHADONE: NEGATIVE
POC METHAMPHETAMINE SCREEN URINE: NEGATIVE
POC OPIATES: NEGATIVE
POC OXYCODONE: NEGATIVE
POC PHENCYCLIDINE: NEGATIVE
POC PROPOXYPHENE: NEGATIVE
POC THC: NEGATIVE
POC TRICYCLIC ANTIDEPRESSANTS: NEGATIVE

## 2024-10-31 RX ORDER — METOPROLOL SUCCINATE 50 MG
50 TABLET, EXTENDED RELEASE 24 HR ORAL DAILY
Qty: 90 TABLET | Refills: 1 | Status: SHIPPED | OUTPATIENT
Start: 2024-10-31

## 2024-10-31 RX ORDER — ALPRAZOLAM 0.5 MG
0.5 TABLET ORAL 2 TIMES DAILY PRN
Qty: 30 TABLET | Refills: 2 | Status: SHIPPED | OUTPATIENT
Start: 2024-10-31

## 2024-10-31 RX ORDER — METFORMIN HYDROCHLORIDE 750 MG/1
750 TABLET, EXTENDED RELEASE ORAL 2 TIMES DAILY WITH MEALS
Qty: 180 TABLET | Refills: 0 | Status: SHIPPED | OUTPATIENT
Start: 2024-10-31

## 2024-10-31 RX ORDER — MELOXICAM 7.5 MG/1
7.5 TABLET ORAL DAILY
COMMUNITY

## 2024-10-31 NOTE — LETTER
*UDS DUE*  Controlled Substance Prescribing Agreement          I, Greyson Eagle [PATIENT],  1950 [] a patient of  Marco Ontiveros MD   [PROVIDER] at [facility] [PRACTICE], have been informed that  individuals who are prescribed certain Controlled Substances including, but not limited to, narcotic pain medicines, stimulants, benzodiazepine tranquilizers, and barbiturate sedatives, can abuse those substances or may allow abuse by others, and have some risk of developing an addictive disorder or suffering a relapse of a prior addiction. Therefore, I have been informed that it is necessary to observe strict rules pertaining to their use, and I agree to follow the terms and procedures described in this Agreement as consideration for, and as a condition of, the willingness of the physician whose signature appears below to consider prescribing or to continue prescribing Controlled Substances to treat my pain.     1. I will inform my physician of any current or past substance abuse, or any current or past substance abuse of any immediate member of my immediate family.     2. I agree that I may be subject to a voluntary evaluation by psychologists and/or psychiatrists, possibly at my own expense, before any Controlled Substances will be prescribed to me. I agree that the need to be evaluated by psychologists and/or psychiatrists may be revisited every three (3) to six (6) months thereafter while taking the medication.     3. All Controlled Substances must come from a provider in the PROVIDER’S PRACTICE. My Controlled Substances will come from the PROVIDER whose signature appears below, or during his or her absence, by the covering provider, unless specific written authorization is obtained from the office for an exception.     4. I will obtain all Controlled Substances from the same pharmacy. Should the need arise to change pharmacies, I will inform the PROVIDER’S office.     5. I will  inform the PROVIDER’S office of any new medications or medical conditions, and of any adverse effects I experience from any of the medications that I take.     6. I will inform my other health care providers that I am taking the Controlled Substances listed above, and of the existence of this Agreement. In the event of an emergency, I will provide the foregoing information to emergency department providers.     7. I agree that my prescribing PROVIDER has permission to discuss all diagnostic and treatment details with other health care providers, pharmacists, or other professionals who provide my health care regarding my use of Controlled Substances for purposes of maintaining accountability.     8. I will not allow anyone else to have, use sell, or otherwise have access to these medications. The sharing of medications with anyone is absolutely forbidden and is against the law.         9. I understand that Controlled Substances may be hazardous or lethal to a person who is not tolerant to their effects, especially a child, and that I must keep them out of reach of such people for their own safety.     10. I understand that tampering with a written prescription is a felony and I will not change or tamper with the PROVIDER’S written prescription.     11. I am aware that attempting to obtain a Controlled Substance under false pretenses is illegal.     12. I agree not to alter my medication in any way, and I will take my medication whole, and it will not be broken, chewed, crushed, injected, or snorted.     13. I will take my medication as instructed and prescribed, and I will not exceed the maximum prescribed dose. Any change in dosage must be approved by the PROVIDER or a physician within the PRACTICE.     14. I understand that these drugs should not be stopped abruptly, as withdrawal syndromes may develop.     15. I will cooperate with unannounced urine or serum toxicology screenings as may be requested, as well as  any random pill counts of medication by the PROVIDER. Failure to comply may result in termination of the PROVIDER-patient relationship.     16. I understand that the presence of unauthorized and/or illegal substances in the screenings described in the paragraph above may prompt referral for assessment for a substance abuse disorder or termination of the PROVIDER-patient relationship.     17. I understand that medications may not be replaced if they are lost, damaged, or stolen. If any of these situations arise that cause me to request an early refill of my medication, a copy of a filed police report or a statement from me explaining the circumstances may be required before additional prescriptions are considered. If I request an early refill secondary to lost, damaged, or stolen prescriptions twice within a year, I may be discharged from the practice.     18. I understand that a prescription may be given early if the PROVIDER or the patient will be out of town when the refill is due. These prescriptions will contain instructions to the pharmacist that the prescriptions(s) may not be filled prior to the appropriate date.     19. If the responsible legal authorities have questions concerning my treatment, as may occur, for example, if I obtained medication at several pharmacies, all confidentiality is waived, and these authorities may be given full access to my full records of Controlled Substances administration.     20. I will keep my scheduled appointments in order to receive medication renewals. If I need to cancel my appointment, I will do so a minimum of twenty-four (24) hours before it is scheduled.     21. I understand that I may be asked to bring my medications in their original container to the PROVIDER’s office while I am on controlled medication.     22. Refills generally will not be given over the phone, after office hours, during the weekends, and on holidays.     23. I understand that any medical  treatment is initially a trial, with the goal of treatment being to improve the quality of life and ability to function and/or work. These parameters will be assessed periodically to determine the benefits of continued therapy, and continued prescription is contingent on whether my physician believes that the medication usage benefits me. I will comply with all treatments as outlined by the PROVIDER.     24. I have been explained the risks and potential benefits of these therapies, including, but not limited to, psychological addiction, physical dependence, withdrawal and over dosage.     25. I understand that failure to adhere to these policies and/or failure to comply with the PROVIDER’S treatment plan may result in cessation of therapy with Controlled Substance prescribing by the PROVIDER or referral for further specialty assessment, as well as possible discharge from the PRACTICE.     26. I, the undersigned patient, attest that the foregoing was discussed with me, and that I have read, fully understand, and agree to all of the above requirements and instructions. I affirm that I have the full right and power to sign and be bound by this  Agreement.         Date:  10/31/2024    Patient Printed Name:  Greyson Fco 1950    Patient Signature:  ________________________________           Date:  10/31/2024    Provider Printed Name: Marco Ontiveros MD    Provider Signature:  _______________________________

## 2024-10-31 NOTE — PROGRESS NOTES
Subjective   The ABCs of the Annual Wellness Visit  Medicare Wellness Visit      Greyson Eagle is a 74 y.o. patient who presents for a Medicare Wellness Visit.    The following portions of the patient's history were reviewed and   updated as appropriate: allergies, current medications, past family history, past medical history, past social history, past surgical history, and problem list.    Compared to one year ago, the patient's physical   health is the same.  Compared to one year ago, the patient's mental   health is the same.    Recent Hospitalizations:  He was not admitted to the hospital during the last year.     Current Medical Providers:  Patient Care Team:  Marco Ontiveros MD as PCP - General (Family Medicine)    Outpatient Medications Prior to Visit   Medication Sig Dispense Refill    aspirin 81 MG EC tablet Take 1 tablet by mouth Daily.      finasteride (PROSCAR) 5 MG tablet Take 1 tablet by mouth Daily.      glucosamine-chondroitin 500-400 MG capsule capsule Take  by mouth 3 (Three) Times a Day With Meals.      glucose blood test strip Use as instructed 50 each 11    meloxicam (MOBIC) 7.5 MG tablet Take 1 tablet by mouth Daily.      multivitamin with minerals (ONE-A-DAY 50 PLUS PO) Take 1 tablet by mouth Daily.      Omega-3 Fatty Acids (fish oil) 1000 MG capsule capsule Take  by mouth Daily With Breakfast.      tamsulosin (FLOMAX) 0.4 MG capsule 24 hr capsule Take 1 capsule by mouth Daily.      ALPRAZolam (Xanax) 0.5 MG tablet Take 1 tablet by mouth 2 (Two) Times a Day As Needed for Anxiety. 30 tablet 2    empagliflozin (Jardiance) 25 MG tablet tablet Take 1 tablet by mouth Daily. 30 tablet 3    lisinopril (PRINIVIL,ZESTRIL) 10 MG tablet Take 1 tablet by mouth Daily. 90 tablet 1    metFORMIN ER (GLUCOPHAGE-XR) 750 MG 24 hr tablet Take 1 tablet by mouth 2 (Two) Times a Day With Meals. 180 tablet 0    Toprol XL 50 MG 24 hr tablet Take 1 tablet by mouth Daily. 90 tablet 1     No facility-administered  "medications prior to visit.     No opioid medication identified on active medication list. I have reviewed chart for other potential  high risk medication/s and harmful drug interactions in the elderly.      Aspirin is on active medication list. Aspirin use is indicated based on review of current medical condition/s. Pros and cons of this therapy have been discussed today. Benefits of this medication outweigh potential harm.  Patient has been encouraged to continue taking this medication.  .      Patient Active Problem List   Diagnosis    Age-related nuclear cataract of both eyes    Diabetes mellitus without complication     Advance Care Planning Advance Directive is not on file.  ACP discussion was held with the patient during this visit. Patient does not have an advance directive, information provided.   wife would be his healthcare surrogate; Haylee Eagle [phone]  Daughter Bianca antonio is secondary          Objective   Vitals:    10/31/24 1308   BP: 110/62   BP Location: Left arm   Patient Position: Sitting   Cuff Size: Adult   Pulse: 86   SpO2: 97%   Weight: 75.8 kg (167 lb)   Height: 167.6 cm (66\")       Estimated body mass index is 26.95 kg/m² as calculated from the following:    Height as of this encounter: 167.6 cm (66\").    Weight as of this encounter: 75.8 kg (167 lb).            Does the patient have evidence of cognitive impairment? No  Lab Results   Component Value Date    HGBA1C 6.8 (A) 10/31/2024                                                                                                Health  Risk Assessment    Smoking Status:  Social History     Tobacco Use   Smoking Status Never   Smokeless Tobacco Never     Alcohol Consumption:  Social History     Substance and Sexual Activity   Alcohol Use Not Currently       Fall Risk Screen  STEADI Fall Risk Assessment was completed, and patient is at MODERATE risk for falls. Assessment completed on:10/31/2024    Depression Screening:      " 10/31/2024     1:10 PM   PHQ-2/PHQ-9 Depression Screening   Little interest or pleasure in doing things Not at all   Feeling down, depressed, or hopeless Not at all     Health Habits and Functional and Cognitive Screening:      10/31/2024     1:10 PM   Functional & Cognitive Status   Do you have difficulty preparing food and eating? No   Do you have difficulty bathing yourself, getting dressed or grooming yourself? No   Do you have difficulty using the toilet? No   Do you have difficulty moving around from place to place? No   Do you have trouble with steps or getting out of a bed or a chair? No   Current Diet Well Balanced Diet   Dental Exam Not up to date   Eye Exam Up to date   Exercise (times per week) 0 times per week   Current Exercises Include No Regular Exercise   Do you need help using the phone?  No   Are you deaf or do you have serious difficulty hearing?  No   Do you need help to go to places out of walking distance? No   Do you need help shopping? No   Do you need help preparing meals?  No   Do you need help with housework?  No   Do you need help with laundry? No   Do you need help taking your medications? No   Do you need help managing money? No   Do you ever drive or ride in a car without wearing a seat belt? No   Have you felt unusual stress, anger or loneliness in the last month? No   Who do you live with? Spouse   If you need help, do you have trouble finding someone available to you? No   Do you have difficulty concentrating, remembering or making decisions? No           Age-appropriate Screening Schedule:  Refer to the list below for future screening recommendations based on patient's age, sex and/or medical conditions. Orders for these recommended tests are listed in the plan section. The patient has been provided with a written plan.    Health Maintenance List  Health Maintenance   Topic Date Due    ZOSTER VACCINE (1 of 2) Never done    ANNUAL WELLNESS VISIT  09/18/2024    DIABETIC EYE EXAM   10/30/2024    INFLUENZA VACCINE  03/31/2025 (Originally 8/1/2024)    RSV Vaccine - Adults (1 - 1-dose 75+ series) 04/19/2025 (Originally 8/21/2023)    Pneumococcal Vaccine 65+ (1 of 2 - PCV) 04/19/2025 (Originally 8/21/1956)    TDAP/TD VACCINES (2 - Tdap) 04/19/2025 (Originally 2/23/2017)    LIPID PANEL  12/13/2024    URINE MICROALBUMIN  04/19/2025    HEMOGLOBIN A1C  04/30/2025    BMI FOLLOWUP  07/24/2025    HEPATITIS C SCREENING  Completed    COVID-19 Vaccine  Discontinued                                                                                                                                                CMS Preventative Services Quick Reference  Risk Factors Identified During Encounter  Immunizations Discussed/Encouraged: Influenza, Shingrix, and COVID19    The above risks/problems have been discussed with the patient.  Pertinent information has been shared with the patient in the After Visit Summary.  An After Visit Summary and PPPS were made available to the patient.    Follow Up:   Next Medicare Wellness visit to be scheduled in 1 year.         Additional E&M Note during same encounter follows:  Patient has additional, significant, and separately identifiable condition(s)/problem(s) that require work above and beyond the Medicare Wellness Visit     Chief Complaint  Medicare Wellness-subsequent    Subjective   HPI  Greyson is also being seen today for additional medical problem/s.  He is also here to follow-up on his chronic medical problems and anxiety needs his Xanax refill.  He has been stable on this    He did see Dr. Choudhary and gave him some meloxicam for his shoulder and told him follow-up in 6 weeks.    He declined any flu shot or COVID shot    For his diabetes A1c here today in the office is 6.8 is doing better he just taking his medications as directed    He wonders about possibly discontinuing lisinopril as he takes his metoprolol tartrate blood pressure.  And has done well in the Jardiance no  "burning stinging or itching and no redness of the genital region.  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    Review of Systems   Constitutional: Negative for fatigue and fever.   Respiratory: Negative for cough and shortness of breath.    Cardiovascular: Negative for chest pain and palpitations.   Skin: Negative for rash or itching                Objective   Vital Signs:  /62 (BP Location: Left arm, Patient Position: Sitting, Cuff Size: Adult)   Pulse 86   Ht 167.6 cm (66\")   Wt 75.8 kg (167 lb)   SpO2 97%   BMI 26.95 kg/m²   Physical Exam  Vitals and nursing note reviewed.   Constitutional:       Appearance: Normal appearance.   HENT:      Head: Normocephalic and atraumatic.   Cardiovascular:      Rate and Rhythm: Normal rate and regular rhythm.   Pulmonary:      Effort: Pulmonary effort is normal.      Breath sounds: Normal breath sounds.   Musculoskeletal:         General: Normal range of motion.      Cervical back: Normal range of motion and neck supple.      Right lower leg: No edema.      Left lower leg: No edema.   Skin:     General: Skin is warm and dry.   Neurological:      General: No focal deficit present.      Mental Status: He is alert.   Psychiatric:         Mood and Affect: Mood normal.         Behavior: Behavior normal.                 Assessment and Plan               Encounter for subsequent annual wellness visit (AWV) in Medicare patient    Anxiety    High risk medication use    Type 2 diabetes mellitus with stage 3a chronic kidney disease, without long-term current use of insulin    Hyperlipidemia, unspecified hyperlipidemia type     Type 2 diabetes mellitus with hyperglycemia, without long-term current use of insulin    Primary hypertension    Annual wellness completed    Immunizations recommended at pharmacy if he desires or changes his " mind    Continue with current diabetes regimen he is doing well at goal at 6.8 with his A1c    For his blood pressure we may discontinue lisinopril just monitor blood pressure as directed he is hoping this may help with his cough that he had    Continue Jardiance and keep his penis clean and dry if any burning stinging itching redness he will stop the medicine and see us immediately or another physician    Labs today be sure up and follow-up on those test results and will follow-up in 3 months.    Due to his elevated creatinine recommended not using the meloxicam  And if he has pain she just use Tylenol.    Follow-up in 3 months on his diabetes his anxiety and blood pressure.    Jose Luis reviewed he signed the consent UDS up-to-date  Orders Placed This Encounter   Procedures    CBC Auto Differential     Standing Status:   Future     Number of Occurrences:   1     Standing Expiration Date:   10/31/2025     Order Specific Question:   Release to patient     Answer:   Routine Release [[id number]]    Comprehensive Metabolic Panel     Standing Status:   Future     Number of Occurrences:   1     Standing Expiration Date:   10/31/2025     Order Specific Question:   Release to patient     Answer:   Routine Release [[id number]]    Lipid Panel     Standing Status:   Future     Number of Occurrences:   1     Standing Expiration Date:   10/31/2025     Order Specific Question:   Release to patient     Answer:   Routine Release [[id number]]    CK     Standing Status:   Future     Number of Occurrences:   1     Standing Expiration Date:   10/31/2025     Order Specific Question:   Release to patient     Answer:   Routine Release [[id number]]    POC Glycosylated Hemoglobin (Hb A1C)     Order Specific Question:   Release to patient     Answer:   Routine Release [[id number]]    POC Urine Drug Screen, Triage     Order Specific Question:   Release to patient     Answer:   Routine Release [[id number]]     New Medications Ordered This Visit    Medications    ALPRAZolam (Xanax) 0.5 MG tablet     Sig: Take 1 tablet by mouth 2 (Two) Times a Day As Needed for Anxiety.     Dispense:  30 tablet     Refill:  2    empagliflozin (Jardiance) 25 MG tablet tablet     Sig: Take 1 tablet by mouth Daily.     Dispense:  30 tablet     Refill:  5     Cancel prior  rx    metFORMIN ER (GLUCOPHAGE-XR) 750 MG 24 hr tablet     Sig: Take 1 tablet by mouth 2 (Two) Times a Day With Meals.     Dispense:  180 tablet     Refill:  0    Toprol XL 50 MG 24 hr tablet     Sig: Take 1 tablet by mouth Daily.     Dispense:  90 tablet     Refill:  1          Follow Up   Return in about 3 months (around 1/31/2025) for Recheck.  Patient was given instructions and counseling regarding his condition or for health maintenance advice. Please see specific information pulled into the AVS if appropriate.

## 2024-10-31 NOTE — PATIENT INSTRUCTIONS

## 2024-11-01 LAB
ALBUMIN SERPL-MCNC: 3.9 G/DL (ref 3.8–4.8)
ALP SERPL-CCNC: 46 IU/L (ref 44–121)
ALT SERPL-CCNC: 14 IU/L (ref 0–44)
AST SERPL-CCNC: 17 IU/L (ref 0–40)
BASOPHILS # BLD AUTO: 0.1 X10E3/UL (ref 0–0.2)
BASOPHILS NFR BLD AUTO: 1 %
BILIRUB SERPL-MCNC: 0.2 MG/DL (ref 0–1.2)
BUN SERPL-MCNC: 32 MG/DL (ref 8–27)
BUN/CREAT SERPL: 18 (ref 10–24)
CALCIUM SERPL-MCNC: 9.2 MG/DL (ref 8.6–10.2)
CHLORIDE SERPL-SCNC: 107 MMOL/L (ref 96–106)
CHOLEST SERPL-MCNC: 188 MG/DL (ref 100–199)
CK SERPL-CCNC: 83 U/L (ref 41–331)
CO2 SERPL-SCNC: 21 MMOL/L (ref 20–29)
CREAT SERPL-MCNC: 1.81 MG/DL (ref 0.76–1.27)
EGFRCR SERPLBLD CKD-EPI 2021: 38 ML/MIN/1.73
EOSINOPHIL # BLD AUTO: 0.4 X10E3/UL (ref 0–0.4)
EOSINOPHIL NFR BLD AUTO: 6 %
ERYTHROCYTE [DISTWIDTH] IN BLOOD BY AUTOMATED COUNT: 11.8 % (ref 11.6–15.4)
GLOBULIN SER CALC-MCNC: 2.2 G/DL (ref 1.5–4.5)
GLUCOSE SERPL-MCNC: 118 MG/DL (ref 70–99)
HCT VFR BLD AUTO: 39.9 % (ref 37.5–51)
HDLC SERPL-MCNC: 36 MG/DL
HGB BLD-MCNC: 13.4 G/DL (ref 13–17.7)
IMM GRANULOCYTES # BLD AUTO: 0 X10E3/UL (ref 0–0.1)
IMM GRANULOCYTES NFR BLD AUTO: 0 %
LDLC SERPL CALC-MCNC: 124 MG/DL (ref 0–99)
LYMPHOCYTES # BLD AUTO: 1.8 X10E3/UL (ref 0.7–3.1)
LYMPHOCYTES NFR BLD AUTO: 26 %
MCH RBC QN AUTO: 30 PG (ref 26.6–33)
MCHC RBC AUTO-ENTMCNC: 33.6 G/DL (ref 31.5–35.7)
MCV RBC AUTO: 89 FL (ref 79–97)
MONOCYTES # BLD AUTO: 0.5 X10E3/UL (ref 0.1–0.9)
MONOCYTES NFR BLD AUTO: 8 %
NEUTROPHILS # BLD AUTO: 4.2 X10E3/UL (ref 1.4–7)
NEUTROPHILS NFR BLD AUTO: 59 %
PLATELET # BLD AUTO: 231 X10E3/UL (ref 150–450)
POTASSIUM SERPL-SCNC: 5.3 MMOL/L (ref 3.5–5.2)
PROT SERPL-MCNC: 6.1 G/DL (ref 6–8.5)
RBC # BLD AUTO: 4.47 X10E6/UL (ref 4.14–5.8)
SODIUM SERPL-SCNC: 140 MMOL/L (ref 134–144)
TRIGL SERPL-MCNC: 156 MG/DL (ref 0–149)
VLDLC SERPL CALC-MCNC: 28 MG/DL (ref 5–40)
WBC # BLD AUTO: 7 X10E3/UL (ref 3.4–10.8)

## 2024-12-19 ENCOUNTER — TELEPHONE (OUTPATIENT)
Dept: FAMILY MEDICINE CLINIC | Facility: CLINIC | Age: 74
End: 2024-12-19
Payer: MEDICARE

## 2024-12-19 NOTE — TELEPHONE ENCOUNTER
Caller: CHANDRIKA MOTA     Relationship: WIFE    Best call back number: [phone]    What is your medical concern? PAIN IN BACK AND HIP AND RASH ALL OVER PELVIC AREA    How long has this issue been going on? RASH SINCE END OF OCT.  PAIN SINCE LAST WEEK    Is your provider already aware of this issue? NO    Have you been treated for this issue? PATIENT WONDERS IF THIS IS REACTION FROM empagliflozin (Jardiance) 25 MG tablet tablet.    HE IS OUT OF TOWN UNTIL 1/31/24. WANTS TO KNOW IF CAN STOP TAKING IT AND KEEP TAKING METFORMIN.    PHARMACIST ADVISED PATIENT THAT THIS RX CAUSES THESE REACTIONS.

## 2024-12-20 NOTE — TELEPHONE ENCOUNTER
Jennifer informed of message.    She is wanting to know if patient should go back to the Metformin the same way as before starting the Jardiance?

## 2024-12-23 NOTE — TELEPHONE ENCOUNTER
Spoke with Jennifer. She states that patient has stopped the Jardiance and has went back on the metformin. She states that the patient will continue the metformin and they will discuss everything with provider at the follow up visit. States they will call if anything gets worse.

## 2025-01-07 ENCOUNTER — TELEPHONE (OUTPATIENT)
Dept: FAMILY MEDICINE CLINIC | Facility: CLINIC | Age: 75
End: 2025-01-07
Payer: MEDICARE

## 2025-01-07 NOTE — TELEPHONE ENCOUNTER
Caller: CHANDRIKA MOTA    Relationship: Emergency Contact    Best call back number: [phone]    Which medication are you concerned about: CALLED STATED THAT RX    Toprol XL 50 MG 24 hr tablet   NEEDS A PA

## 2025-01-08 ENCOUNTER — TELEPHONE (OUTPATIENT)
Dept: FAMILY MEDICINE CLINIC | Facility: CLINIC | Age: 75
End: 2025-01-08
Payer: MEDICARE

## 2025-01-08 DIAGNOSIS — I10 PRIMARY HYPERTENSION: Primary | ICD-10-CM

## 2025-01-08 DIAGNOSIS — I10 PRIMARY HYPERTENSION: ICD-10-CM

## 2025-01-08 RX ORDER — BISOPROLOL FUMARATE 5 MG/1
5 TABLET, FILM COATED ORAL DAILY
Qty: 90 TABLET | Refills: 1 | Status: SHIPPED | OUTPATIENT
Start: 2025-01-08

## 2025-01-08 RX ORDER — BISOPROLOL FUMARATE 5 MG/1
5 TABLET, FILM COATED ORAL DAILY
Qty: 90 TABLET | Refills: 1 | Status: SHIPPED | OUTPATIENT
Start: 2025-01-08 | End: 2025-01-08 | Stop reason: SDUPTHER

## 2025-01-08 NOTE — TELEPHONE ENCOUNTER
Sent PA for patients Toprol XL. It came back denied due to insurance wanting him to try the 3 below.    atenolol tablet   bisoprolol fumarate tablet   carvedilol tablet    I did not see these listed in his chart. Please advise.

## 2025-01-10 ENCOUNTER — TELEPHONE (OUTPATIENT)
Dept: FAMILY MEDICINE CLINIC | Facility: CLINIC | Age: 75
End: 2025-01-10
Payer: MEDICARE

## 2025-01-10 ENCOUNTER — TELEPHONE (OUTPATIENT)
Dept: FAMILY MEDICINE CLINIC | Facility: CLINIC | Age: 75
End: 2025-01-10

## 2025-01-10 NOTE — TELEPHONE ENCOUNTER
Caller: CHANDRIKA MOTA    Relationship: Emergency Contact    Best call back number: [phone]      What was the call regarding: PATIENTS WIFE CALLED IN REQUESTING A MEDICATION APPEAL BE SENT FOR THE PATIENTS TOPROL. PLEASE INCLUDE THAT THE PATIENT HAS BEEN ON THIS MEDIATION FOR OVER 20 YEARS AND THAT IT HAS BEEN HELPFUL THIS WHOLE TIME. PLEASE CALL THE PATIENTS WIFE BACK WITH ANY QUESTIONS

## 2025-01-10 NOTE — TELEPHONE ENCOUNTER
Name: CHANDRIKA MOTA      Relationship: Emergency Contact      Best Callback Number: [phone]       HUB PROVIDED THE RELAY MESSAGE FROM THE OFFICE      PATIENT: HAS FURTHER QUESTIONS AND WOULD LIKE A CALL BACK AT THE FOLLOWING PHONE NUMBER  [phone]     ADDITIONAL INFORMATION: CHANDRIKA STATED PATIENT CANNOT TAKE THE BISOPROLOL. IT DOESN'T WORK AND IS ALLERGIC TO THE FILLERS IN OTHER MEDICATIONS.  SHE STATED HE'S ALREADY TRIED SEVERAL MEDICATIONS FOR HIS BP BUT NONE HAVE WORKED EXCEPT THE TOPROL.   CHANDRIKA IS CONCERNED PATIENT IS GOING TO HAVE A HEART ATTACK IF DOESN'T GET THE TOPROL.

## 2025-01-10 NOTE — TELEPHONE ENCOUNTER
HUB TO RELY      Insurance would not cover his Toprol. Dr. Ontiveros sent in Bisoprolol 5mg. Please advise patient to keep appointment on 1/31.

## 2025-01-13 ENCOUNTER — TELEPHONE (OUTPATIENT)
Dept: FAMILY MEDICINE CLINIC | Facility: CLINIC | Age: 75
End: 2025-01-13
Payer: MEDICARE

## 2025-01-13 NOTE — TELEPHONE ENCOUNTER
Caller: CHANDRIKA MOTA    Relationship: Emergency Contact    Best call back number:       [phone] (Mobile)     Which medication are you concerned about:     TOPROL - (50) MG    Who prescribed you this medication:     DR VEGA    What are your concerns:     CALLER STATED NEW INSURANCE, [employer] MEDICARE, DENIED REFILL OF MEDICATION    CALLER REQUESTED DR VEGA SUBMIT PRIOR AUTHORIZATION APPEAL FOR MEDICATION TO BE REFILLED    PREFERRED PHARMACY:    [facility] - [place], [place]    TELEPHONE CONTACT:    [phone]

## 2025-01-14 NOTE — TELEPHONE ENCOUNTER
HUB TO RELY    No voicemail is set up. Please let patient know that I am currently working on an appeal for the Toprol. I will call as soon as I get a response back that it is approved.

## 2025-01-22 ENCOUNTER — TELEPHONE (OUTPATIENT)
Dept: FAMILY MEDICINE CLINIC | Facility: CLINIC | Age: 75
End: 2025-01-22

## 2025-01-22 DIAGNOSIS — I10 PRIMARY HYPERTENSION: Primary | ICD-10-CM

## 2025-01-22 RX ORDER — METOPROLOL SUCCINATE 50 MG/1
50 TABLET, EXTENDED RELEASE ORAL DAILY
Qty: 90 TABLET | Refills: 1 | Status: SHIPPED | OUTPATIENT
Start: 2025-01-22

## 2025-01-22 NOTE — TELEPHONE ENCOUNTER
Received an authorization back that the Toprol 50 XL was approved. Can you resubmit this to the pharmacy?

## 2025-01-22 NOTE — TELEPHONE ENCOUNTER
Caller: CHANDRIKA MOTA    Relationship: Emergency Contact    Best call back number: [phone]    Which medication are you concerned about: PT STATE SHE RECEIVED LETTER FROM INSURANCE REGARDING RX TOPROL, DATED 1/8, PT NOT SURE IF LETTER FROM AFTER THE APPEAL WAS REQUEST OR BEFORE

## 2025-01-31 ENCOUNTER — OFFICE VISIT (OUTPATIENT)
Dept: FAMILY MEDICINE CLINIC | Facility: CLINIC | Age: 75
End: 2025-01-31
Payer: MEDICARE

## 2025-01-31 VITALS
WEIGHT: 175.8 LBS | OXYGEN SATURATION: 98 % | SYSTOLIC BLOOD PRESSURE: 124 MMHG | HEIGHT: 66 IN | DIASTOLIC BLOOD PRESSURE: 76 MMHG | HEART RATE: 76 BPM | BODY MASS INDEX: 28.25 KG/M2 | RESPIRATION RATE: 12 BRPM

## 2025-01-31 DIAGNOSIS — B36.9 DERMATOMYCOSIS: ICD-10-CM

## 2025-01-31 DIAGNOSIS — Z11.59 ENCOUNTER FOR HEPATITIS C SCREENING TEST FOR LOW RISK PATIENT: ICD-10-CM

## 2025-01-31 DIAGNOSIS — Z79.899 HIGH RISK MEDICATION USE: ICD-10-CM

## 2025-01-31 DIAGNOSIS — F41.9 ANXIETY: Primary | ICD-10-CM

## 2025-01-31 DIAGNOSIS — E11.65 TYPE 2 DIABETES MELLITUS WITH HYPERGLYCEMIA, WITHOUT LONG-TERM CURRENT USE OF INSULIN: ICD-10-CM

## 2025-01-31 LAB
EXPIRATION DATE: NORMAL
Lab: NORMAL
POC AMPHETAMINES: NEGATIVE
POC BARBITURATES: NEGATIVE
POC BENZODIAZEPHINES: POSITIVE
POC COCAINE: NEGATIVE
POC CREATININE URINE: 0
POC METHADONE: NEGATIVE
POC METHAMPHETAMINE SCREEN URINE: NEGATIVE
POC MICROALBUMIN URINE: 20
POC OPIATES: NEGATIVE
POC OXYCODONE: NEGATIVE
POC PHENCYCLIDINE: NEGATIVE
POC PROPOXYPHENE: NEGATIVE
POC THC: NEGATIVE
POC TRICYCLIC ANTIDEPRESSANTS: NEGATIVE

## 2025-01-31 PROCEDURE — 1126F AMNT PAIN NOTED NONE PRSNT: CPT | Performed by: FAMILY MEDICINE

## 2025-01-31 PROCEDURE — 82044 UR ALBUMIN SEMIQUANTITATIVE: CPT | Performed by: FAMILY MEDICINE

## 2025-01-31 PROCEDURE — 1159F MED LIST DOCD IN RCRD: CPT | Performed by: FAMILY MEDICINE

## 2025-01-31 PROCEDURE — 1160F RVW MEDS BY RX/DR IN RCRD: CPT | Performed by: FAMILY MEDICINE

## 2025-01-31 PROCEDURE — 99214 OFFICE O/P EST MOD 30 MIN: CPT | Performed by: FAMILY MEDICINE

## 2025-01-31 RX ORDER — ALPRAZOLAM 0.5 MG
0.5 TABLET ORAL 2 TIMES DAILY PRN
Qty: 30 TABLET | Refills: 2 | Status: SHIPPED | OUTPATIENT
Start: 2025-01-31

## 2025-01-31 RX ORDER — METFORMIN HYDROCHLORIDE 750 MG/1
750 TABLET, EXTENDED RELEASE ORAL 2 TIMES DAILY WITH MEALS
Qty: 180 TABLET | Refills: 0 | Status: SHIPPED | OUTPATIENT
Start: 2025-01-31

## 2025-01-31 RX ORDER — NYSTATIN 100000 U/G
1 CREAM TOPICAL 2 TIMES DAILY
Qty: 30 G | Refills: 0 | Status: SHIPPED | OUTPATIENT
Start: 2025-01-31

## 2025-01-31 NOTE — LETTER
Controlled Substance Prescribing Agreement          I, Greyson Eagle [PATIENT],  1950 [] a patient of  Marco Ontiveros MD   [PROVIDER] at [facility] [PRACTICE], have been informed that  individuals who are prescribed certain Controlled Substances including, but not limited to, narcotic pain medicines, stimulants, benzodiazepine tranquilizers, and barbiturate sedatives, can abuse those substances or may allow abuse by others, and have some risk of developing an addictive disorder or suffering a relapse of a prior addiction. Therefore, I have been informed that it is necessary to observe strict rules pertaining to their use, and I agree to follow the terms and procedures described in this Agreement as consideration for, and as a condition of, the willingness of the physician whose signature appears below to consider prescribing or to continue prescribing Controlled Substances to treat my pain.     1. I will inform my physician of any current or past substance abuse, or any current or past substance abuse of any immediate member of my immediate family.     2. I agree that I may be subject to a voluntary evaluation by psychologists and/or psychiatrists, possibly at my own expense, before any Controlled Substances will be prescribed to me. I agree that the need to be evaluated by psychologists and/or psychiatrists may be revisited every three (3) to six (6) months thereafter while taking the medication.     3. All Controlled Substances must come from a provider in the PROVIDER’S PRACTICE. My Controlled Substances will come from the PROVIDER whose signature appears below, or during his or her absence, by the covering provider, unless specific written authorization is obtained from the office for an exception.     4. I will obtain all Controlled Substances from the same pharmacy. Should the need arise to change pharmacies, I will inform the PROVIDER’S office.     5. I will inform the  PROVIDER’S office of any new medications or medical conditions, and of any adverse effects I experience from any of the medications that I take.     6. I will inform my other health care providers that I am taking the Controlled Substances listed above, and of the existence of this Agreement. In the event of an emergency, I will provide the foregoing information to emergency department providers.     7. I agree that my prescribing PROVIDER has permission to discuss all diagnostic and treatment details with other health care providers, pharmacists, or other professionals who provide my health care regarding my use of Controlled Substances for purposes of maintaining accountability.     8. I will not allow anyone else to have, use sell, or otherwise have access to these medications. The sharing of medications with anyone is absolutely forbidden and is against the law.         9. I understand that Controlled Substances may be hazardous or lethal to a person who is not tolerant to their effects, especially a child, and that I must keep them out of reach of such people for their own safety.     10. I understand that tampering with a written prescription is a felony and I will not change or tamper with the PROVIDER’S written prescription.     11. I am aware that attempting to obtain a Controlled Substance under false pretenses is illegal.     12. I agree not to alter my medication in any way, and I will take my medication whole, and it will not be broken, chewed, crushed, injected, or snorted.     13. I will take my medication as instructed and prescribed, and I will not exceed the maximum prescribed dose. Any change in dosage must be approved by the PROVIDER or a physician within the PRACTICE.     14. I understand that these drugs should not be stopped abruptly, as withdrawal syndromes may develop.     15. I will cooperate with unannounced urine or serum toxicology screenings as may be requested, as well as any random  pill counts of medication by the PROVIDER. Failure to comply may result in termination of the PROVIDER-patient relationship.     16. I understand that the presence of unauthorized and/or illegal substances in the screenings described in the paragraph above may prompt referral for assessment for a substance abuse disorder or termination of the PROVIDER-patient relationship.     17. I understand that medications may not be replaced if they are lost, damaged, or stolen. If any of these situations arise that cause me to request an early refill of my medication, a copy of a filed police report or a statement from me explaining the circumstances may be required before additional prescriptions are considered. If I request an early refill secondary to lost, damaged, or stolen prescriptions twice within a year, I may be discharged from the practice.     18. I understand that a prescription may be given early if the PROVIDER or the patient will be out of town when the refill is due. These prescriptions will contain instructions to the pharmacist that the prescriptions(s) may not be filled prior to the appropriate date.     19. If the responsible legal authorities have questions concerning my treatment, as may occur, for example, if I obtained medication at several pharmacies, all confidentiality is waived, and these authorities may be given full access to my full records of Controlled Substances administration.     20. I will keep my scheduled appointments in order to receive medication renewals. If I need to cancel my appointment, I will do so a minimum of twenty-four (24) hours before it is scheduled.     21. I understand that I may be asked to bring my medications in their original container to the PROVIDER’s office while I am on controlled medication.     22. Refills generally will not be given over the phone, after office hours, during the weekends, and on holidays.     23. I understand that any medical treatment is  initially a trial, with the goal of treatment being to improve the quality of life and ability to function and/or work. These parameters will be assessed periodically to determine the benefits of continued therapy, and continued prescription is contingent on whether my physician believes that the medication usage benefits me. I will comply with all treatments as outlined by the PROVIDER.     24. I have been explained the risks and potential benefits of these therapies, including, but not limited to, psychological addiction, physical dependence, withdrawal and over dosage.     25. I understand that failure to adhere to these policies and/or failure to comply with the PROVIDER’S treatment plan may result in cessation of therapy with Controlled Substance prescribing by the PROVIDER or referral for further specialty assessment, as well as possible discharge from the PRACTICE.     26. I, the undersigned patient, attest that the foregoing was discussed with me, and that I have read, fully understand, and agree to all of the above requirements and instructions. I affirm that I have the full right and power to sign and be bound by this  Agreement.         Date:  __________________________________________      Patient Printed Name:  _____________________________    Patient Signature:  ________________________________           Date:  __________________________________________    Provider Printed Name: Marco Ontiveros MD    Provider Signature:  _______________________________

## 2025-01-31 NOTE — PROGRESS NOTES
Follow Up Office Visit      Patient Name: Greyson Eagle  : 1950   MRN: [id number]     Chief Complaint:    Chief Complaint   Patient presents with    Hypertension    Anxiety       History of Present Illness: Greyson Eagle is a 74 y.o. male who is here today to   follow-up on anxiety and needs meds refilled    History of Present Illness  The patient is a 74-year-old male who comes in today for follow-up and medication refills.    He has been managing his anxiety with Xanax, typically taking one dose daily. He has been on metformin 750 mg twice daily for diabetes management. He discontinued Jardiance due to an adverse reaction characterized by a rash. Despite the discontinuation, he reports a persistent rash, which he attributes to the Jardiance. He has been using an over-the-counter antifungal cream, which alleviates the itching but does not eliminate the rash. His blood glucose levels have been increasing since stopping Jardiance, with a recent reading of 144. He has not tried Januvia or Tradjenta. He has previously experienced intolerance to glipizide     He has been prescribed metoprolol     Review of systems-- He reports no chest pain, palpitations, heaviness, or tightness.    He has a history of gastrointestinal issues, managed with Pepcid and Maalox tablets as needed. He believes that ibuprofen use may have contributed to the development of hernia ulcers.    He is currently not on any cholesterol medication, having discontinued it years ago due to side effects.    ALLERGIES  The patient has had an allergic reaction to JARDIANCE.    MEDICATIONS  Current: Alprazolam, metformin, glucosamine chondroitin, meloxicam, aspirin, bisoprolol, finasteride, tamsulosin, multivitamins, fish oil, Pepcid  Discontinued: Jardiance      Subjective      Review of Systems:   Review of Systems    Past Medical History:   Past Medical History:   Diagnosis Date    Allergic rhinitis     Anxiety     Benign essential  hypertension     Carotid artery disease     Modwerate carotid disease noted at vascular screening    Diabetes     Mixed hyperlipidemia        Past Surgical History:   Past Surgical History:   Procedure Laterality Date    COLON RESECTION  2001    Colonic resection due to large polyp    CYSTECTOMY      Resection of cyst at previous site of colonic surg    HEMORRHOIDECTOMY         Family History: History reviewed. No pertinent family history.    Social History:   Social History     Socioeconomic History    Marital status:    Tobacco Use    Smoking status: Never    Smokeless tobacco: Never   Vaping Use    Vaping status: Never Used   Substance and Sexual Activity    Alcohol use: Not Currently    Drug use: Never    Sexual activity: Defer       Medications:     Current Outpatient Medications:     ALPRAZolam (Xanax) 0.5 MG tablet, Take 1 tablet by mouth 2 (Two) Times a Day As Needed for Anxiety., Disp: 30 tablet, Rfl: 2    aspirin 81 MG EC tablet, Take 1 tablet by mouth Daily., Disp: , Rfl:     finasteride (PROSCAR) 5 MG tablet, Take 1 tablet by mouth Daily., Disp: , Rfl:     glucosamine-chondroitin 500-400 MG capsule capsule, Take  by mouth 3 (Three) Times a Day With Meals., Disp: , Rfl:     glucose blood test strip, Use as instructed, Disp: 50 each, Rfl: 11    meloxicam (MOBIC) 7.5 MG tablet, Take 1 tablet by mouth Daily., Disp: , Rfl:     metFORMIN ER (GLUCOPHAGE-XR) 750 MG 24 hr tablet, Take 1 tablet by mouth 2 (Two) Times a Day With Meals., Disp: 180 tablet, Rfl: 0    metoprolol succinate XL (Toprol XL) 50 MG 24 hr tablet, Take 1 tablet by mouth Daily., Disp: 90 tablet, Rfl: 1    multivitamin with minerals (ONE-A-DAY 50 PLUS PO), Take 1 tablet by mouth Daily., Disp: , Rfl:     tamsulosin (FLOMAX) 0.4 MG capsule 24 hr capsule, Take 1 capsule by mouth Daily., Disp: , Rfl:     linagliptin (Tradjenta) 5 MG tablet tablet, Take 1 tablet by mouth Daily., Disp: 30 tablet, Rfl: 5    nystatin (MYCOSTATIN) 403781 UNIT/GM  "cream, Apply 1 Application topically to the appropriate area as directed 2 (Two) Times a Day., Disp: 30 g, Rfl: 0    Allergies:   Allergies   Allergen Reactions    Azithromycin Unknown - High Severity    Crestor [Rosuvastatin] Unknown - High Severity    Methylprednisolone Unknown - High Severity    Percocet [Oxycodone-Acetaminophen] Unknown - High Severity    Protonix [Pantoprazole] Unknown - High Severity    Tricor [Fenofibrate] Unknown - High Severity       Objective     Physical Exam:  Vital Signs:   Vitals:    01/31/25 1338   BP: 124/76   Pulse: 76   Resp: 12   SpO2: 98%   Weight: 79.7 kg (175 lb 12.8 oz)   Height: 167.6 cm (66\")   PainSc: 0-No pain     Facility age limit for growth %jose is 20 years.  Body mass index is 28.37 kg/m².     Physical Exam  Vitals and nursing note reviewed.   Constitutional:       Appearance: Normal appearance.   HENT:      Head: Normocephalic and atraumatic.   Cardiovascular:      Rate and Rhythm: Normal rate and regular rhythm.   Pulmonary:      Effort: Pulmonary effort is normal.      Breath sounds: Normal breath sounds.   Abdominal:      Palpations: Abdomen is soft.      Tenderness: There is no abdominal tenderness.   Genitourinary:     Penis: Normal.       Testes: Normal.      Comments: Very minimal erythema on the scrotum where he points to his being the rash he is uncircumcised the glans and penis normal--may be just a tad bit of erythema underneath the foreskin but basically normal    No hernia no masses  Musculoskeletal:         General: Normal range of motion.      Cervical back: Normal range of motion and neck supple.      Right lower leg: No edema.      Left lower leg: No edema.   Skin:     General: Skin is warm and dry.   Neurological:      General: No focal deficit present.      Mental Status: He is alert.         Procedures    PHQ-9 Total Score:      Assessment / Plan      Assessment/Plan:   Diagnoses and all orders for this visit:    1. Anxiety (Primary)  -     " ALPRAZolam (Xanax) 0.5 MG tablet; Take 1 tablet by mouth 2 (Two) Times a Day As Needed for Anxiety.  Dispense: 30 tablet; Refill: 2    2. Type 2 diabetes mellitus with hyperglycemia, without long-term current use of insulin  -     metFORMIN ER (GLUCOPHAGE-XR) 750 MG 24 hr tablet; Take 1 tablet by mouth 2 (Two) Times a Day With Meals.  Dispense: 180 tablet; Refill: 0  -     POCT microalbumin  -     Comprehensive Metabolic Panel; Future  -     Hemoglobin A1c; Future  -     linagliptin (Tradjenta) 5 MG tablet tablet; Take 1 tablet by mouth Daily.  Dispense: 30 tablet; Refill: 5  -     Hemoglobin A1c  -     Comprehensive Metabolic Panel    3. Encounter for hepatitis C screening test for low risk patient    4. High risk medication use  -     POC Urine Drug Screen, Triage    5. Dermatomycosis  -     nystatin (MYCOSTATIN) 738647 UNIT/GM cream; Apply 1 Application topically to the appropriate area as directed 2 (Two) Times a Day.  Dispense: 30 g; Refill: 0         Assessment & Plan  1. Diabetes Mellitus.  His blood sugar levels have been gradually increasing since discontinuing Jardiance due to a rash. He is currently on metformin 750 mg twice a day. A prescription for Tradjenta 5 mg once daily has been provided to help lower his blood sugar levels. He is advised to continue metformin 750 mg twice a day. Blood work will be conducted to monitor his A1c levels and kidney function.    2. Rash.  He developed a rash, likely a yeast infection, after using Jardiance. A prescription for nystatin cream has been provided, to be applied twice daily to the scrotum and the tip of the penis. He is advised to keep the area clean and dry. If the rash becomes itchy, he can use over-the-counter mild cortisone.  If not better he will return but exam just shows minimal to no rash/erythema    3. Medication Management.  He is currently taking alprazolam (Xanax) once a day, glucosamine chondroitin, meloxicam, aspirin, bisoprolol, finasteride 5  mg, tamsulosin 0.4 mg, multivitamins, and fish oil. He has resumed metoprolol after insurance approval. Refills for his current medications will be sent to [facility].  Jose Luis reviewed he signed the consent UDS up-to-date    We did discuss the advantages of using medicine like Jardiance to help protect his kidneys and that we could certainly retry it later if he is willing.  But right now he prefers to not try any medicine that might give him a rash.    Follow-up  The patient will follow up in 3 months.    Will get blood work today and follow-up on those test results as directed follow-up in 3 months as directed  Microalbumin 20  UDS appropriate    Follow Up:   Return in about 3 months (around 4/30/2025) for Recheck.        Patient or patient representative verbalized consent for the use of Ambient Listening during the visit with  Marco Ontiveros MD for chart documentation. 1/31/2025  16:47 EST    Marco Ontiveros MD  [facility]   Portions of note created with Dragon voice recognition technology

## 2025-02-01 LAB
ALBUMIN SERPL-MCNC: 4.2 G/DL (ref 3.8–4.8)
ALP SERPL-CCNC: 66 IU/L (ref 44–121)
ALT SERPL-CCNC: 13 IU/L (ref 0–44)
AST SERPL-CCNC: 18 IU/L (ref 0–40)
BILIRUB SERPL-MCNC: 0.3 MG/DL (ref 0–1.2)
BUN SERPL-MCNC: 21 MG/DL (ref 8–27)
BUN/CREAT SERPL: 14 (ref 10–24)
CALCIUM SERPL-MCNC: 9.4 MG/DL (ref 8.6–10.2)
CHLORIDE SERPL-SCNC: 105 MMOL/L (ref 96–106)
CO2 SERPL-SCNC: 21 MMOL/L (ref 20–29)
CREAT SERPL-MCNC: 1.5 MG/DL (ref 0.76–1.27)
EGFRCR SERPLBLD CKD-EPI 2021: 48 ML/MIN/1.73
GLOBULIN SER CALC-MCNC: 2.5 G/DL (ref 1.5–4.5)
GLUCOSE SERPL-MCNC: 126 MG/DL (ref 70–99)
HBA1C MFR BLD: 7.7 % (ref 4.8–5.6)
POTASSIUM SERPL-SCNC: 4.8 MMOL/L (ref 3.5–5.2)
PROT SERPL-MCNC: 6.7 G/DL (ref 6–8.5)
SODIUM SERPL-SCNC: 139 MMOL/L (ref 134–144)

## 2025-02-03 ENCOUNTER — TELEPHONE (OUTPATIENT)
Dept: FAMILY MEDICINE CLINIC | Facility: CLINIC | Age: 75
End: 2025-02-03
Payer: MEDICARE

## 2025-02-03 NOTE — TELEPHONE ENCOUNTER
PT IS REQUESTING A CALL TO DISCUSS ABNORMAL LABS AS HE IS NOT ABLE TO COME IN DUE TO THE ROADS GETTING BAD

## 2025-02-05 ENCOUNTER — TELEPHONE (OUTPATIENT)
Dept: FAMILY MEDICINE CLINIC | Facility: CLINIC | Age: 75
End: 2025-02-05
Payer: MEDICARE

## 2025-02-05 NOTE — TELEPHONE ENCOUNTER
Name: Greyson Eagle      Relationship: Self      Best Callback Number: [phone]      HUB PROVIDED THE RELAY MESSAGE FROM THE OFFICE      PATIENT: HAS FURTHER QUESTIONS AND WOULD LIKE A CALL BACK AT THE FOLLOWING PHONE [phone]    ADDITIONAL INFORMATION: PATIENT IS WANTING THE TEST RESULTS FROM HIS BLOOD WORK MAILED TO HIM VIA POSTAL SERVICE, AND WOULD LIKE A CALL BACK. RELAYED THE MESSAGE BUT PATIENT HAS QUESTIONS ABOUT THE MESSAGE.

## 2025-02-05 NOTE — TELEPHONE ENCOUNTER
Marco Ontiveros MD  [facility]2 days ago       Tell him his A1c was elevated ----he does need to take the metformin and Tradjenta as directed repeat blood work in 3 months get regular exercise and cut back on concentrated sweets and limit carbs  Kidney function is the same chronic kidney disease stage III--he should avoid Advil Aleve ibuprofen keep his blood pressure under control and drink water   Called to inform patient, no answer and unable to leave a VM.  (Hub to relay)

## 2025-02-05 NOTE — TELEPHONE ENCOUNTER
Caller: CHANDRIKA MOTA    Relationship: Emergency Contact    Best call back number: [phone]    What form or medical record are you requesting: LAST RECENT TEST RESULTS    How would you like to receive the form or medical records (pick-up, mail, fax):   [address]  [place] [address]     Timeframe paperwork needed: ASAP

## 2025-02-25 DIAGNOSIS — E11.65 TYPE 2 DIABETES MELLITUS WITH HYPERGLYCEMIA, WITHOUT LONG-TERM CURRENT USE OF INSULIN: Primary | ICD-10-CM

## 2025-02-25 DIAGNOSIS — Z79.899 HIGH RISK MEDICATION USE: ICD-10-CM

## 2025-02-27 ENCOUNTER — TELEPHONE (OUTPATIENT)
Dept: FAMILY MEDICINE CLINIC | Facility: CLINIC | Age: 75
End: 2025-02-27
Payer: MEDICARE

## 2025-02-27 NOTE — TELEPHONE ENCOUNTER
Caller: CHANDRIKA MOTA    Relationship: Emergency Contact    Best call back number: [phone]    What is the best time to reach you: ANYTIME     Who are you requesting to speak with (clinical staff, provider,  specific staff member): CLINICAL STAFF     PATIENT WIFE STATES THAT THEY SWITCHED PHARMACIES. PHARMACY HAS REQUESTED THE PATIENT TO LET OFFICE KNOW THEY ARE SENDING PAPERWORK FOR THE SCRIPTS ON THE TEST STRIPS.

## 2025-04-25 DIAGNOSIS — E11.9 TYPE 2 DIABETES MELLITUS WITHOUT COMPLICATION, WITHOUT LONG-TERM CURRENT USE OF INSULIN: ICD-10-CM

## 2025-04-25 RX ORDER — CALCIUM CITRATE/VITAMIN D3 200MG-6.25
TABLET ORAL SEE ADMIN INSTRUCTIONS
Qty: 100 EACH | Refills: 5 | OUTPATIENT
Start: 2025-04-25

## 2025-04-25 RX ORDER — CALCIUM CITRATE/VITAMIN D3 200MG-6.25
TABLET ORAL SEE ADMIN INSTRUCTIONS
Qty: 100 EACH | Refills: 5 | Status: SHIPPED | OUTPATIENT
Start: 2025-04-25

## 2025-04-25 NOTE — TELEPHONE ENCOUNTER
Caller: Greyson Eagle    Relationship: Self    Best call back number: [phone]     Requested Prescriptions:   Requested Prescriptions     Pending Prescriptions Disp Refills    glucose blood (True Metrix Blood Glucose Test) test strip 100 each 5     Sig: See Admin Instructions.        Pharmacy where request should be sent: [address] [phone] [facility] [phone]      Last office visit with prescribing clinician: 1/31/2025   Last telemedicine visit with prescribing clinician: Visit date not found   Next office visit with prescribing clinician: 5/1/2025       Yanci Perkins Rep   04/25/25 11:24 EDT

## 2025-05-01 ENCOUNTER — OFFICE VISIT (OUTPATIENT)
Dept: FAMILY MEDICINE CLINIC | Facility: CLINIC | Age: 75
End: 2025-05-01
Payer: MEDICARE

## 2025-05-01 VITALS
RESPIRATION RATE: 12 BRPM | BODY MASS INDEX: 28.61 KG/M2 | HEIGHT: 66 IN | OXYGEN SATURATION: 95 % | SYSTOLIC BLOOD PRESSURE: 134 MMHG | WEIGHT: 178 LBS | HEART RATE: 80 BPM | DIASTOLIC BLOOD PRESSURE: 80 MMHG

## 2025-05-01 DIAGNOSIS — Z79.899 HIGH RISK MEDICATION USE: ICD-10-CM

## 2025-05-01 DIAGNOSIS — N18.31 TYPE 2 DIABETES MELLITUS WITH STAGE 3A CHRONIC KIDNEY DISEASE, WITHOUT LONG-TERM CURRENT USE OF INSULIN: ICD-10-CM

## 2025-05-01 DIAGNOSIS — I10 PRIMARY HYPERTENSION: ICD-10-CM

## 2025-05-01 DIAGNOSIS — E11.22 TYPE 2 DIABETES MELLITUS WITH STAGE 3A CHRONIC KIDNEY DISEASE, WITHOUT LONG-TERM CURRENT USE OF INSULIN: ICD-10-CM

## 2025-05-01 DIAGNOSIS — F41.9 ANXIETY: ICD-10-CM

## 2025-05-01 DIAGNOSIS — E11.65 TYPE 2 DIABETES MELLITUS WITH HYPERGLYCEMIA, WITHOUT LONG-TERM CURRENT USE OF INSULIN: Primary | ICD-10-CM

## 2025-05-01 RX ORDER — METFORMIN HYDROCHLORIDE 750 MG/1
750 TABLET, EXTENDED RELEASE ORAL 2 TIMES DAILY WITH MEALS
Qty: 180 TABLET | Refills: 0 | Status: SHIPPED | OUTPATIENT
Start: 2025-05-01

## 2025-05-01 RX ORDER — ALPRAZOLAM 0.5 MG
0.5 TABLET ORAL 2 TIMES DAILY PRN
Qty: 30 TABLET | Refills: 2 | Status: SHIPPED | OUTPATIENT
Start: 2025-05-01

## 2025-05-01 NOTE — PROGRESS NOTES
Follow Up Office Visit      Patient Name: Greyson Eagle  : 1950   MRN: [id number]     Chief Complaint:    Chief Complaint   Patient presents with    Diabetes     3 mo follow up       History of Present Illness: Greyson Eagle is a 74 y.o. male who is here today to   follow-up on his chronic medical problems and relates that he has some other issues he like to discuss.  #1 needs his Xanax refilled he stable on that medicine for his anxiety  2.  He is hard about friends that got tick bites and then can eat red meat and then have anaphylaxis and he says he wants an EpiPen to have on hand in case he gets bit by take with a white spot on it.  He has never had any anaphylaxis or any problems  3.  He took the Tradjenta and said it gave him leg pain and then maybe a little rash on his penis so he stopped it is been off it a month  4.  Here to go over blood work we reviewed that with him in detail A1c down to 7.0  5.  He has been seeing Dr. 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    Review of Systems   Constitutional: Negative for fatigue and fever.   Respiratory: Negative for cough and shortness of breath.    Cardiovascular: Negative for chest pain and palpitations.   Skin: Negative for rash or itching      History of Present Illness        Subjective      Review of Systems:   Review of Systems    Past Medical History:   Past Medical History:   Diagnosis Date    Allergic rhinitis     Anxiety     Benign essential hypertension     Carotid artery disease     Modwerate carotid disease noted at vascular screening    Diabetes     Mixed hyperlipidemia        Past Surgical History:   Past Surgical History:   Procedure Laterality Date    COLON RESECTION      Colonic resection due to large polyp    CYSTECTOMY      Resection of cyst at previous site of colonic surg    HEMORRHOIDECTOMY         Family History: History  reviewed. No pertinent family history.    Social History:   Social History     Socioeconomic History    Marital status:    Tobacco Use    Smoking status: Never    Smokeless tobacco: Never   Vaping Use    Vaping status: Never Used   Substance and Sexual Activity    Alcohol use: Not Currently    Drug use: Never    Sexual activity: Defer       Medications:     Current Outpatient Medications:     ALPRAZolam (Xanax) 0.5 MG tablet, Take 1 tablet by mouth 2 (Two) Times a Day As Needed for Anxiety., Disp: 30 tablet, Rfl: 2    aspirin 81 MG EC tablet, Take 1 tablet by mouth Daily., Disp: , Rfl:     finasteride (PROSCAR) 5 MG tablet, Take 1 tablet by mouth Daily., Disp: , Rfl:     glucosamine-chondroitin 500-400 MG capsule capsule, Take  by mouth 3 (Three) Times a Day With Meals., Disp: , Rfl:     glucose blood (True Metrix Blood Glucose Test) test strip, AS DIRECTED, Disp: 100 each, Rfl: 5    meloxicam (MOBIC) 7.5 MG tablet, Take 1 tablet by mouth Daily., Disp: , Rfl:     metFORMIN ER (GLUCOPHAGE-XR) 750 MG 24 hr tablet, Take 1 tablet by mouth 2 (Two) Times a Day With Meals., Disp: 180 tablet, Rfl: 0    metoprolol succinate XL (Toprol XL) 50 MG 24 hr tablet, Take 1 tablet by mouth Daily., Disp: 90 tablet, Rfl: 1    multivitamin with minerals (ONE-A-DAY 50 PLUS PO), Take 1 tablet by mouth Daily., Disp: , Rfl:     nystatin (MYCOSTATIN) 290749 UNIT/GM cream, Apply 1 Application topically to the appropriate area as directed 2 (Two) Times a Day., Disp: 30 g, Rfl: 0    tamsulosin (FLOMAX) 0.4 MG capsule 24 hr capsule, Take 1 capsule by mouth Daily., Disp: , Rfl:     SITagliptin (Januvia) 50 MG tablet, Take 1 tablet by mouth Daily., Disp: 30 tablet, Rfl: 5    Allergies:   Allergies   Allergen Reactions    Azithromycin Unknown - High Severity    Crestor [Rosuvastatin] Unknown - High Severity    Methylprednisolone Unknown - High Severity    Percocet [Oxycodone-Acetaminophen] Unknown - High Severity    Protonix [Pantoprazole]  "Unknown - High Severity    Tricor [Fenofibrate] Unknown - High Severity       Objective     Physical Exam:  Vital Signs:   Vitals:    05/01/25 1353   BP: 134/80   Pulse: 80   Resp: 12   SpO2: 95%   Weight: 80.7 kg (178 lb)   Height: 167.6 cm (66\")   PainSc: 4    PainLoc: Arm     Facility age limit for growth %jose is 20 years.  Body mass index is 28.73 kg/m².     Physical Exam  Vitals and nursing note reviewed.   Constitutional:       Appearance: Normal appearance.   HENT:      Head: Normocephalic and atraumatic.      Nose: Nose normal.   Cardiovascular:      Rate and Rhythm: Normal rate and regular rhythm.   Pulmonary:      Effort: Pulmonary effort is normal.      Breath sounds: Normal breath sounds.   Musculoskeletal:         General: No tenderness. Normal range of motion.      Cervical back: Normal range of motion and neck supple.      Right lower leg: No edema.      Left lower leg: No edema.   Skin:     General: Skin is warm and dry.   Neurological:      General: No focal deficit present.      Mental Status: He is alert.         Procedures    PHQ-9 Total Score:      Assessment / Plan      Assessment/Plan:   Diagnoses and all orders for this visit:    1. Type 2 diabetes mellitus with hyperglycemia, without long-term current use of insulin (Primary)  -     Microalbumin / Creatinine Urine Ratio - Urine, Clean Catch; Future  -     CBC Auto Differential; Future  -     Comprehensive Metabolic Panel; Future  -     Hemoglobin A1c; Future  -     metFORMIN ER (GLUCOPHAGE-XR) 750 MG 24 hr tablet; Take 1 tablet by mouth 2 (Two) Times a Day With Meals.  Dispense: 180 tablet; Refill: 0    2. Anxiety  -     ALPRAZolam (Xanax) 0.5 MG tablet; Take 1 tablet by mouth 2 (Two) Times a Day As Needed for Anxiety.  Dispense: 30 tablet; Refill: 2    3. Primary hypertension    4. Type 2 diabetes mellitus with stage 3a chronic kidney disease, without long-term current use of insulin    5. High risk medication use  -     CBC Auto " Differential; Future  -     Comprehensive Metabolic Panel; Future    Other orders  -     SITagliptin (Januvia) 50 MG tablet; Take 1 tablet by mouth Daily.  Dispense: 30 tablet; Refill: 5       For his diabetes we will switch over to Januvia 50 mg once per day and see if this medicine agrees with him better he had a little rash with the Jardiance and leg pain and rash with the Tradjenta he used the nystatin cream he said that helped the rash on the penis.    Continue metformin as is drink plenty fluids    Labs reviewed with him    Refilled Xanax Jose Luis reviewed     Urine microalbumin creatinine ratio today    Follow-up with blood work in 3 months    Continue good diet exercise avoiding concentrated sweets and cutting back on carbs    His wife thinks he had troubles with glipizide in the past also so has had a number of drug intolerances.    Regarding possible tick bites and possible anaphylaxis told him he would have to see an allergist to get that prescribed  As it is not standard of care I am unaware of prescribing EpiPen for folks who have not had any anaphylaxis to take bites previously  Assessment & Plan             Follow Up:   Return in about 3 months (around 8/1/2025) for Recheck, Labs prior next visit.            Marco Ontiveros MD  [facility]   Portions of note created with Dragon voice recognition technology

## 2025-05-02 LAB
ALBUMIN/CREAT UR: 6 MG/G CREAT (ref 0–29)
CREAT UR-MCNC: 122.5 MG/DL
MICROALBUMIN UR-MCNC: 7.5 UG/ML

## 2025-08-01 ENCOUNTER — OFFICE VISIT (OUTPATIENT)
Dept: FAMILY MEDICINE CLINIC | Facility: CLINIC | Age: 75
End: 2025-08-01
Payer: MEDICARE

## 2025-08-01 VITALS
HEART RATE: 80 BPM | SYSTOLIC BLOOD PRESSURE: 116 MMHG | OXYGEN SATURATION: 97 % | BODY MASS INDEX: 27.66 KG/M2 | WEIGHT: 172.1 LBS | DIASTOLIC BLOOD PRESSURE: 64 MMHG | HEIGHT: 66 IN | RESPIRATION RATE: 12 BRPM

## 2025-08-01 DIAGNOSIS — F41.9 ANXIETY: ICD-10-CM

## 2025-08-01 DIAGNOSIS — E11.65 TYPE 2 DIABETES MELLITUS WITH HYPERGLYCEMIA, WITHOUT LONG-TERM CURRENT USE OF INSULIN: ICD-10-CM

## 2025-08-01 DIAGNOSIS — F51.01 PRIMARY INSOMNIA: ICD-10-CM

## 2025-08-01 DIAGNOSIS — I10 PRIMARY HYPERTENSION: ICD-10-CM

## 2025-08-01 DIAGNOSIS — R20.2 PARESTHESIAS IN RIGHT HAND: ICD-10-CM

## 2025-08-01 DIAGNOSIS — Z79.899 HIGH RISK MEDICATION USE: ICD-10-CM

## 2025-08-01 DIAGNOSIS — M15.9 OSTEOARTHRITIS OF MULTIPLE JOINTS, UNSPECIFIED OSTEOARTHRITIS TYPE: Primary | ICD-10-CM

## 2025-08-01 RX ORDER — NAPROXEN 500 MG/1
500 TABLET ORAL 2 TIMES DAILY WITH MEALS
Qty: 12 TABLET | Refills: 0 | Status: SHIPPED | OUTPATIENT
Start: 2025-08-01

## 2025-08-01 RX ORDER — METFORMIN HYDROCHLORIDE 750 MG/1
750 TABLET, EXTENDED RELEASE ORAL 2 TIMES DAILY WITH MEALS
Qty: 180 TABLET | Refills: 0 | Status: SHIPPED | OUTPATIENT
Start: 2025-08-01

## 2025-08-01 RX ORDER — METOPROLOL SUCCINATE 50 MG/1
50 TABLET, EXTENDED RELEASE ORAL DAILY
Qty: 90 TABLET | Refills: 1 | Status: SHIPPED | OUTPATIENT
Start: 2025-08-01

## 2025-08-01 RX ORDER — TRAZODONE HYDROCHLORIDE 50 MG/1
50 TABLET ORAL NIGHTLY
Qty: 90 TABLET | Refills: 1 | Status: SHIPPED | OUTPATIENT
Start: 2025-08-01

## 2025-08-01 RX ORDER — ALPRAZOLAM 0.5 MG
0.5 TABLET ORAL 2 TIMES DAILY PRN
Qty: 30 TABLET | Refills: 2 | Status: SHIPPED | OUTPATIENT
Start: 2025-08-01

## 2025-08-01 RX ORDER — KETOCONAZOLE 20 MG/G
1 CREAM TOPICAL DAILY
COMMUNITY
Start: 2025-05-21

## 2025-08-01 RX ORDER — FAMOTIDINE 20 MG/1
20 TABLET, FILM COATED ORAL DAILY
COMMUNITY

## 2025-08-02 LAB
ALBUMIN SERPL-MCNC: 4 G/DL (ref 3.8–4.8)
ALP SERPL-CCNC: 49 IU/L (ref 44–121)
ALT SERPL-CCNC: 15 IU/L (ref 0–44)
AST SERPL-CCNC: 15 IU/L (ref 0–40)
BASOPHILS # BLD AUTO: 0.1 X10E3/UL (ref 0–0.2)
BASOPHILS NFR BLD AUTO: 1 %
BILIRUB SERPL-MCNC: 0.2 MG/DL (ref 0–1.2)
BUN SERPL-MCNC: 35 MG/DL (ref 8–27)
BUN/CREAT SERPL: 21 (ref 10–24)
CALCIUM SERPL-MCNC: 9 MG/DL (ref 8.6–10.2)
CHLORIDE SERPL-SCNC: 103 MMOL/L (ref 96–106)
CO2 SERPL-SCNC: 19 MMOL/L (ref 20–29)
CREAT SERPL-MCNC: 1.7 MG/DL (ref 0.76–1.27)
EGFRCR SERPLBLD CKD-EPI 2021: 41 ML/MIN/1.73
EOSINOPHIL # BLD AUTO: 0.4 X10E3/UL (ref 0–0.4)
EOSINOPHIL NFR BLD AUTO: 4 %
ERYTHROCYTE [DISTWIDTH] IN BLOOD BY AUTOMATED COUNT: 12.5 % (ref 11.6–15.4)
GLOBULIN SER CALC-MCNC: 2.3 G/DL (ref 1.5–4.5)
GLUCOSE SERPL-MCNC: 175 MG/DL (ref 70–99)
HBA1C MFR BLD: 7.1 % (ref 4.8–5.6)
HCT VFR BLD AUTO: 38.4 % (ref 37.5–51)
HGB BLD-MCNC: 12.3 G/DL (ref 13–17.7)
IMM GRANULOCYTES # BLD AUTO: 0 X10E3/UL (ref 0–0.1)
IMM GRANULOCYTES NFR BLD AUTO: 0 %
LYMPHOCYTES # BLD AUTO: 1.4 X10E3/UL (ref 0.7–3.1)
LYMPHOCYTES NFR BLD AUTO: 15 %
MCH RBC QN AUTO: 28.9 PG (ref 26.6–33)
MCHC RBC AUTO-ENTMCNC: 32 G/DL (ref 31.5–35.7)
MCV RBC AUTO: 90 FL (ref 79–97)
MONOCYTES # BLD AUTO: 0.6 X10E3/UL (ref 0.1–0.9)
MONOCYTES NFR BLD AUTO: 6 %
NEUTROPHILS # BLD AUTO: 6.9 X10E3/UL (ref 1.4–7)
NEUTROPHILS NFR BLD AUTO: 74 %
PLATELET # BLD AUTO: 215 X10E3/UL (ref 150–450)
POTASSIUM SERPL-SCNC: 5.1 MMOL/L (ref 3.5–5.2)
PROT SERPL-MCNC: 6.3 G/DL (ref 6–8.5)
RBC # BLD AUTO: 4.25 X10E6/UL (ref 4.14–5.8)
SODIUM SERPL-SCNC: 135 MMOL/L (ref 134–144)
WBC # BLD AUTO: 9.4 X10E3/UL (ref 3.4–10.8)

## 2025-08-07 DIAGNOSIS — I10 PRIMARY HYPERTENSION: ICD-10-CM

## 2025-08-07 RX ORDER — METOPROLOL SUCCINATE 50 MG
50 TABLET, EXTENDED RELEASE 24 HR ORAL DAILY
Qty: 90 TABLET | Refills: 1 | Status: SHIPPED | OUTPATIENT
Start: 2025-08-07